# Patient Record
Sex: FEMALE | Race: BLACK OR AFRICAN AMERICAN | NOT HISPANIC OR LATINO | Employment: OTHER | URBAN - METROPOLITAN AREA
[De-identification: names, ages, dates, MRNs, and addresses within clinical notes are randomized per-mention and may not be internally consistent; named-entity substitution may affect disease eponyms.]

---

## 2018-04-23 ENCOUNTER — HOSPITAL ENCOUNTER (INPATIENT)
Facility: OTHER | Age: 63
LOS: 3 days | Discharge: HOME | DRG: 871 | End: 2018-04-27
Attending: EMERGENCY MEDICINE | Admitting: HOSPITALIST
Payer: MEDICAID

## 2018-04-23 DIAGNOSIS — J41.0 SIMPLE CHRONIC BRONCHITIS: ICD-10-CM

## 2018-04-23 DIAGNOSIS — R09.02 HYPOXIA: ICD-10-CM

## 2018-04-23 DIAGNOSIS — J18.9 SEPSIS DUE TO PNEUMONIA: ICD-10-CM

## 2018-04-23 DIAGNOSIS — A41.9 SEPSIS DUE TO PNEUMONIA: ICD-10-CM

## 2018-04-23 DIAGNOSIS — R09.02 HYPOXEMIA: ICD-10-CM

## 2018-04-23 DIAGNOSIS — R06.02 SOB (SHORTNESS OF BREATH): ICD-10-CM

## 2018-04-23 DIAGNOSIS — J18.9 PNEUMONIA OF BOTH LUNGS DUE TO INFECTIOUS ORGANISM, UNSPECIFIED PART OF LUNG: Primary | ICD-10-CM

## 2018-04-23 DIAGNOSIS — D56.3 THALASSEMIA MINOR: ICD-10-CM

## 2018-04-23 DIAGNOSIS — J44.1 COPD WITH ACUTE EXACERBATION: ICD-10-CM

## 2018-04-23 DIAGNOSIS — E11.9 TYPE 2 DIABETES MELLITUS WITHOUT COMPLICATION, WITHOUT LONG-TERM CURRENT USE OF INSULIN: ICD-10-CM

## 2018-04-23 DIAGNOSIS — R07.9 ACUTE CHEST PAIN: ICD-10-CM

## 2018-04-23 LAB
ALBUMIN SERPL BCP-MCNC: 3.4 G/DL
ALP SERPL-CCNC: 67 U/L
ALT SERPL W/O P-5'-P-CCNC: 14 U/L
ANION GAP SERPL CALC-SCNC: 11 MMOL/L
ANISOCYTOSIS BLD QL SMEAR: SLIGHT
AST SERPL-CCNC: 18 U/L
BASO STIPL BLD QL SMEAR: ABNORMAL
BASOPHILS # BLD AUTO: 0.01 K/UL
BASOPHILS NFR BLD: 0.1 %
BILIRUB SERPL-MCNC: 0.6 MG/DL
BNP SERPL-MCNC: 46 PG/ML
BUN SERPL-MCNC: 12 MG/DL
CALCIUM SERPL-MCNC: 9.5 MG/DL
CHLORIDE SERPL-SCNC: 100 MMOL/L
CO2 SERPL-SCNC: 27 MMOL/L
CREAT SERPL-MCNC: 0.8 MG/DL
DIFFERENTIAL METHOD: ABNORMAL
EOSINOPHIL # BLD AUTO: 0.1 K/UL
EOSINOPHIL NFR BLD: 1.1 %
ERYTHROCYTE [DISTWIDTH] IN BLOOD BY AUTOMATED COUNT: 18.7 %
EST. GFR  (AFRICAN AMERICAN): >60 ML/MIN/1.73 M^2
EST. GFR  (NON AFRICAN AMERICAN): >60 ML/MIN/1.73 M^2
GLUCOSE SERPL-MCNC: 155 MG/DL
HCT VFR BLD AUTO: 29.1 %
HGB BLD-MCNC: 8.7 G/DL
HYPOCHROMIA BLD QL SMEAR: ABNORMAL
INR PPP: 0.9
LYMPHOCYTES # BLD AUTO: 1.1 K/UL
LYMPHOCYTES NFR BLD: 10.8 %
MCH RBC QN AUTO: 21.4 PG
MCHC RBC AUTO-ENTMCNC: 29.9 G/DL
MCV RBC AUTO: 72 FL
MONOCYTES # BLD AUTO: 0.2 K/UL
MONOCYTES NFR BLD: 2 %
NEUTROPHILS # BLD AUTO: 8.3 K/UL
NEUTROPHILS NFR BLD: 86 %
PLATELET # BLD AUTO: 196 K/UL
PLATELET BLD QL SMEAR: ABNORMAL
PMV BLD AUTO: ABNORMAL FL
POCT GLUCOSE: 164 MG/DL (ref 70–110)
POLYCHROMASIA BLD QL SMEAR: ABNORMAL
POTASSIUM SERPL-SCNC: 3.7 MMOL/L
PROT SERPL-MCNC: 6.7 G/DL
PROTHROMBIN TIME: 10.3 SEC
RBC # BLD AUTO: 4.06 M/UL
SODIUM SERPL-SCNC: 138 MMOL/L
STOMATOCYTES BLD QL SMEAR: PRESENT
TROPONIN I SERPL DL<=0.01 NG/ML-MCNC: 0.01 NG/ML
WBC # BLD AUTO: 9.68 K/UL

## 2018-04-23 PROCEDURE — 99285 EMERGENCY DEPT VISIT HI MDM: CPT | Mod: 25

## 2018-04-23 PROCEDURE — 83605 ASSAY OF LACTIC ACID: CPT

## 2018-04-23 PROCEDURE — 85610 PROTHROMBIN TIME: CPT

## 2018-04-23 PROCEDURE — 25000003 PHARM REV CODE 250: Performed by: EMERGENCY MEDICINE

## 2018-04-23 PROCEDURE — 87040 BLOOD CULTURE FOR BACTERIA: CPT | Mod: 59

## 2018-04-23 PROCEDURE — 85025 COMPLETE CBC W/AUTO DIFF WBC: CPT

## 2018-04-23 PROCEDURE — 93005 ELECTROCARDIOGRAM TRACING: CPT

## 2018-04-23 PROCEDURE — 84484 ASSAY OF TROPONIN QUANT: CPT

## 2018-04-23 PROCEDURE — 94644 CONT INHLJ TX 1ST HOUR: CPT

## 2018-04-23 PROCEDURE — 96375 TX/PRO/DX INJ NEW DRUG ADDON: CPT

## 2018-04-23 PROCEDURE — 82962 GLUCOSE BLOOD TEST: CPT

## 2018-04-23 PROCEDURE — 96374 THER/PROPH/DIAG INJ IV PUSH: CPT

## 2018-04-23 PROCEDURE — 63600175 PHARM REV CODE 636 W HCPCS: Performed by: EMERGENCY MEDICINE

## 2018-04-23 PROCEDURE — 83880 ASSAY OF NATRIURETIC PEPTIDE: CPT

## 2018-04-23 PROCEDURE — 80053 COMPREHEN METABOLIC PANEL: CPT

## 2018-04-23 PROCEDURE — 25000242 PHARM REV CODE 250 ALT 637 W/ HCPCS: Performed by: EMERGENCY MEDICINE

## 2018-04-23 RX ORDER — TIOTROPIUM BROMIDE 18 UG/1
18 CAPSULE ORAL; RESPIRATORY (INHALATION) DAILY
COMMUNITY

## 2018-04-23 RX ORDER — NAPROXEN SODIUM 220 MG/1
81 TABLET, FILM COATED ORAL DAILY
COMMUNITY

## 2018-04-23 RX ORDER — CEFTRIAXONE 1 G/1
1 INJECTION, POWDER, FOR SOLUTION INTRAMUSCULAR; INTRAVENOUS
Status: COMPLETED | OUTPATIENT
Start: 2018-04-24 | End: 2018-04-24

## 2018-04-23 RX ORDER — PREGABALIN 150 MG/1
150 CAPSULE ORAL 2 TIMES DAILY
COMMUNITY

## 2018-04-23 RX ORDER — ONDANSETRON 4 MG/1
8 TABLET, FILM COATED ORAL 2 TIMES DAILY
COMMUNITY

## 2018-04-23 RX ORDER — ATORVASTATIN CALCIUM 20 MG/1
20 TABLET, FILM COATED ORAL NIGHTLY
COMMUNITY

## 2018-04-23 RX ORDER — AMLODIPINE BESYLATE 5 MG/1
5 TABLET ORAL DAILY
COMMUNITY

## 2018-04-23 RX ORDER — INSULIN GLARGINE 100 [IU]/ML
25 INJECTION, SOLUTION SUBCUTANEOUS NIGHTLY
COMMUNITY

## 2018-04-23 RX ORDER — METOPROLOL SUCCINATE 25 MG/1
25 TABLET, EXTENDED RELEASE ORAL DAILY
COMMUNITY

## 2018-04-23 RX ORDER — ONDANSETRON 2 MG/ML
4 INJECTION INTRAMUSCULAR; INTRAVENOUS
Status: COMPLETED | OUTPATIENT
Start: 2018-04-23 | End: 2018-04-23

## 2018-04-23 RX ORDER — ASPIRIN 325 MG
325 TABLET, DELAYED RELEASE (ENTERIC COATED) ORAL
Status: COMPLETED | OUTPATIENT
Start: 2018-04-23 | End: 2018-04-23

## 2018-04-23 RX ORDER — FLUTICASONE PROPIONATE AND SALMETEROL 250; 50 UG/1; UG/1
1 POWDER RESPIRATORY (INHALATION) 2 TIMES DAILY
COMMUNITY

## 2018-04-23 RX ORDER — ACETAMINOPHEN 500 MG
1000 TABLET ORAL
Status: COMPLETED | OUTPATIENT
Start: 2018-04-23 | End: 2018-04-23

## 2018-04-23 RX ORDER — METFORMIN HYDROCHLORIDE 1000 MG/1
1000 TABLET ORAL 2 TIMES DAILY WITH MEALS
COMMUNITY

## 2018-04-23 RX ORDER — IPRATROPIUM BROMIDE 0.5 MG/2.5ML
500 SOLUTION RESPIRATORY (INHALATION) 4 TIMES DAILY
Status: ON HOLD | COMMUNITY
End: 2018-04-27

## 2018-04-23 RX ORDER — CHLORTHALIDONE 25 MG/1
25 TABLET ORAL DAILY
COMMUNITY

## 2018-04-23 RX ORDER — ALBUTEROL SULFATE 0.63 MG/3ML
0.63 SOLUTION RESPIRATORY (INHALATION) EVERY 6 HOURS PRN
COMMUNITY

## 2018-04-23 RX ORDER — DOCUSATE SODIUM 100 MG/1
100 CAPSULE, LIQUID FILLED ORAL 2 TIMES DAILY
COMMUNITY

## 2018-04-23 RX ORDER — ALBUTEROL SULFATE 0.83 MG/ML
2.5 SOLUTION RESPIRATORY (INHALATION)
Status: COMPLETED | OUTPATIENT
Start: 2018-04-23 | End: 2018-04-23

## 2018-04-23 RX ADMIN — ALBUTEROL SULFATE 2.5 MG: 2.5 SOLUTION RESPIRATORY (INHALATION) at 10:04

## 2018-04-23 RX ADMIN — ACETAMINOPHEN 1000 MG: 500 TABLET ORAL at 11:04

## 2018-04-23 RX ADMIN — ONDANSETRON 4 MG: 2 INJECTION INTRAMUSCULAR; INTRAVENOUS at 10:04

## 2018-04-23 RX ADMIN — ASPIRIN 325 MG: 325 TABLET, DELAYED RELEASE ORAL at 10:04

## 2018-04-24 PROBLEM — J44.1 COPD WITH ACUTE EXACERBATION: Status: ACTIVE | Noted: 2018-04-24

## 2018-04-24 PROBLEM — J18.9 SEPSIS DUE TO PNEUMONIA: Status: ACTIVE | Noted: 2018-04-24

## 2018-04-24 PROBLEM — R09.02 HYPOXEMIA: Status: ACTIVE | Noted: 2018-04-24

## 2018-04-24 PROBLEM — E11.9 TYPE 2 DIABETES MELLITUS: Status: ACTIVE | Noted: 2018-04-24

## 2018-04-24 PROBLEM — D56.3 THALASSEMIA MINOR: Status: ACTIVE | Noted: 2018-04-24

## 2018-04-24 PROBLEM — J45.909 ASTHMA: Status: ACTIVE | Noted: 2018-04-24

## 2018-04-24 PROBLEM — J41.0 SIMPLE CHRONIC BRONCHITIS: Status: ACTIVE | Noted: 2018-04-24

## 2018-04-24 PROBLEM — J44.9 COPD (CHRONIC OBSTRUCTIVE PULMONARY DISEASE): Status: ACTIVE | Noted: 2018-04-24

## 2018-04-24 PROBLEM — J18.9 PNEUMONIA OF BOTH LUNGS DUE TO INFECTIOUS ORGANISM: Status: ACTIVE | Noted: 2018-04-24

## 2018-04-24 PROBLEM — A41.9 SEPSIS DUE TO PNEUMONIA: Status: ACTIVE | Noted: 2018-04-24

## 2018-04-24 LAB
BILIRUB UR QL STRIP: NEGATIVE
CLARITY UR: CLEAR
COLOR UR: YELLOW
ESTIMATED AVG GLUCOSE: 169 MG/DL
GLUCOSE UR QL STRIP: NEGATIVE
HBA1C MFR BLD HPLC: 7.5 %
HGB UR QL STRIP: NEGATIVE
KETONES UR QL STRIP: NEGATIVE
LACTATE SERPL-SCNC: 3.2 MMOL/L
LEUKOCYTE ESTERASE UR QL STRIP: NEGATIVE
NITRITE UR QL STRIP: NEGATIVE
PH UR STRIP: 6 [PH] (ref 5–8)
POCT GLUCOSE: 134 MG/DL (ref 70–110)
POCT GLUCOSE: 161 MG/DL (ref 70–110)
POCT GLUCOSE: 164 MG/DL (ref 70–110)
POCT GLUCOSE: 85 MG/DL (ref 70–110)
PROT UR QL STRIP: NEGATIVE
SP GR UR STRIP: <=1.005 (ref 1–1.03)
URN SPEC COLLECT METH UR: ABNORMAL
UROBILINOGEN UR STRIP-ACNC: NEGATIVE EU/DL

## 2018-04-24 PROCEDURE — 63600175 PHARM REV CODE 636 W HCPCS: Performed by: PHYSICIAN ASSISTANT

## 2018-04-24 PROCEDURE — 27000221 HC OXYGEN, UP TO 24 HOURS

## 2018-04-24 PROCEDURE — 94761 N-INVAS EAR/PLS OXIMETRY MLT: CPT

## 2018-04-24 PROCEDURE — 25000003 PHARM REV CODE 250: Performed by: PHYSICIAN ASSISTANT

## 2018-04-24 PROCEDURE — 94640 AIRWAY INHALATION TREATMENT: CPT

## 2018-04-24 PROCEDURE — 25000242 PHARM REV CODE 250 ALT 637 W/ HCPCS: Performed by: PHYSICIAN ASSISTANT

## 2018-04-24 PROCEDURE — 63600175 PHARM REV CODE 636 W HCPCS: Performed by: EMERGENCY MEDICINE

## 2018-04-24 PROCEDURE — 25000242 PHARM REV CODE 250 ALT 637 W/ HCPCS: Performed by: INTERNAL MEDICINE

## 2018-04-24 PROCEDURE — 81003 URINALYSIS AUTO W/O SCOPE: CPT

## 2018-04-24 PROCEDURE — 83036 HEMOGLOBIN GLYCOSYLATED A1C: CPT

## 2018-04-24 PROCEDURE — 11000001 HC ACUTE MED/SURG PRIVATE ROOM

## 2018-04-24 PROCEDURE — 36415 COLL VENOUS BLD VENIPUNCTURE: CPT

## 2018-04-24 PROCEDURE — 99223 1ST HOSP IP/OBS HIGH 75: CPT | Mod: ,,, | Performed by: HOSPITALIST

## 2018-04-24 RX ORDER — ENOXAPARIN SODIUM 100 MG/ML
40 INJECTION SUBCUTANEOUS EVERY 24 HOURS
Status: DISCONTINUED | OUTPATIENT
Start: 2018-04-24 | End: 2018-04-27 | Stop reason: HOSPADM

## 2018-04-24 RX ORDER — NAPROXEN SODIUM 220 MG/1
81 TABLET, FILM COATED ORAL DAILY
Status: DISCONTINUED | OUTPATIENT
Start: 2018-04-24 | End: 2018-04-27 | Stop reason: HOSPADM

## 2018-04-24 RX ORDER — ONDANSETRON 8 MG/1
8 TABLET, ORALLY DISINTEGRATING ORAL EVERY 8 HOURS PRN
Status: DISCONTINUED | OUTPATIENT
Start: 2018-04-24 | End: 2018-04-27 | Stop reason: HOSPADM

## 2018-04-24 RX ORDER — IBUPROFEN 200 MG
16 TABLET ORAL
Status: DISCONTINUED | OUTPATIENT
Start: 2018-04-24 | End: 2018-04-27 | Stop reason: HOSPADM

## 2018-04-24 RX ORDER — PREGABALIN 75 MG/1
150 CAPSULE ORAL 2 TIMES DAILY
Status: DISCONTINUED | OUTPATIENT
Start: 2018-04-24 | End: 2018-04-27 | Stop reason: HOSPADM

## 2018-04-24 RX ORDER — FLUTICASONE PROPIONATE AND SALMETEROL 250; 50 UG/1; UG/1
1 POWDER RESPIRATORY (INHALATION) 2 TIMES DAILY
Status: DISCONTINUED | OUTPATIENT
Start: 2018-04-24 | End: 2018-04-24

## 2018-04-24 RX ORDER — BENZONATATE 100 MG/1
100 CAPSULE ORAL 3 TIMES DAILY PRN
Status: DISCONTINUED | OUTPATIENT
Start: 2018-04-24 | End: 2018-04-27 | Stop reason: HOSPADM

## 2018-04-24 RX ORDER — FLUTICASONE FUROATE AND VILANTEROL 100; 25 UG/1; UG/1
1 POWDER RESPIRATORY (INHALATION) DAILY
Status: DISCONTINUED | OUTPATIENT
Start: 2018-04-24 | End: 2018-04-27 | Stop reason: HOSPADM

## 2018-04-24 RX ORDER — TIOTROPIUM BROMIDE 18 UG/1
18 CAPSULE ORAL; RESPIRATORY (INHALATION) DAILY
Status: DISCONTINUED | OUTPATIENT
Start: 2018-04-24 | End: 2018-04-24 | Stop reason: CLARIF

## 2018-04-24 RX ORDER — SODIUM CHLORIDE 0.9 % (FLUSH) 0.9 %
5 SYRINGE (ML) INJECTION
Status: DISCONTINUED | OUTPATIENT
Start: 2018-04-24 | End: 2018-04-27 | Stop reason: HOSPADM

## 2018-04-24 RX ORDER — INSULIN ASPART 100 [IU]/ML
0-5 INJECTION, SOLUTION INTRAVENOUS; SUBCUTANEOUS
Status: DISCONTINUED | OUTPATIENT
Start: 2018-04-24 | End: 2018-04-27 | Stop reason: HOSPADM

## 2018-04-24 RX ORDER — INSULIN ASPART 100 [IU]/ML
21 INJECTION, SOLUTION INTRAVENOUS; SUBCUTANEOUS
Status: DISCONTINUED | OUTPATIENT
Start: 2018-04-24 | End: 2018-04-27 | Stop reason: HOSPADM

## 2018-04-24 RX ORDER — ACETAMINOPHEN 325 MG/1
650 TABLET ORAL EVERY 4 HOURS PRN
Status: DISCONTINUED | OUTPATIENT
Start: 2018-04-24 | End: 2018-04-27 | Stop reason: HOSPADM

## 2018-04-24 RX ORDER — GLUCAGON 1 MG
1 KIT INJECTION
Status: DISCONTINUED | OUTPATIENT
Start: 2018-04-24 | End: 2018-04-27 | Stop reason: HOSPADM

## 2018-04-24 RX ORDER — ATORVASTATIN CALCIUM 20 MG/1
20 TABLET, FILM COATED ORAL NIGHTLY
Status: DISCONTINUED | OUTPATIENT
Start: 2018-04-24 | End: 2018-04-27 | Stop reason: HOSPADM

## 2018-04-24 RX ORDER — METOPROLOL SUCCINATE 25 MG/1
25 TABLET, EXTENDED RELEASE ORAL DAILY
Status: DISCONTINUED | OUTPATIENT
Start: 2018-04-24 | End: 2018-04-27 | Stop reason: HOSPADM

## 2018-04-24 RX ORDER — AMLODIPINE BESYLATE 5 MG/1
5 TABLET ORAL DAILY
Status: DISCONTINUED | OUTPATIENT
Start: 2018-04-24 | End: 2018-04-27 | Stop reason: HOSPADM

## 2018-04-24 RX ORDER — SODIUM CHLORIDE 9 MG/ML
INJECTION, SOLUTION INTRAVENOUS CONTINUOUS
Status: DISCONTINUED | OUTPATIENT
Start: 2018-04-24 | End: 2018-04-25

## 2018-04-24 RX ORDER — IPRATROPIUM BROMIDE 0.5 MG/2.5ML
500 SOLUTION RESPIRATORY (INHALATION) 4 TIMES DAILY
Status: DISCONTINUED | OUTPATIENT
Start: 2018-04-24 | End: 2018-04-25

## 2018-04-24 RX ORDER — IBUPROFEN 200 MG
24 TABLET ORAL
Status: DISCONTINUED | OUTPATIENT
Start: 2018-04-24 | End: 2018-04-27 | Stop reason: HOSPADM

## 2018-04-24 RX ORDER — TIOTROPIUM BROMIDE 18 UG/1
1 CAPSULE ORAL; RESPIRATORY (INHALATION) DAILY
Status: DISCONTINUED | OUTPATIENT
Start: 2018-04-24 | End: 2018-04-27 | Stop reason: HOSPADM

## 2018-04-24 RX ADMIN — IPRATROPIUM BROMIDE 500 MCG: 0.5 SOLUTION RESPIRATORY (INHALATION) at 01:04

## 2018-04-24 RX ADMIN — SODIUM CHLORIDE: 0.9 INJECTION, SOLUTION INTRAVENOUS at 05:04

## 2018-04-24 RX ADMIN — ENOXAPARIN SODIUM 40 MG: 100 INJECTION SUBCUTANEOUS at 04:04

## 2018-04-24 RX ADMIN — ASPIRIN 81 MG CHEWABLE TABLET 81 MG: 81 TABLET CHEWABLE at 08:04

## 2018-04-24 RX ADMIN — ACETAMINOPHEN 650 MG: 325 TABLET ORAL at 04:04

## 2018-04-24 RX ADMIN — INSULIN ASPART 21 UNITS: 100 INJECTION, SOLUTION INTRAVENOUS; SUBCUTANEOUS at 12:04

## 2018-04-24 RX ADMIN — INSULIN ASPART 21 UNITS: 100 INJECTION, SOLUTION INTRAVENOUS; SUBCUTANEOUS at 04:04

## 2018-04-24 RX ADMIN — IPRATROPIUM BROMIDE 500 MCG: 0.5 SOLUTION RESPIRATORY (INHALATION) at 08:04

## 2018-04-24 RX ADMIN — AMLODIPINE BESYLATE 5 MG: 5 TABLET ORAL at 08:04

## 2018-04-24 RX ADMIN — PREGABALIN 150 MG: 75 CAPSULE ORAL at 08:04

## 2018-04-24 RX ADMIN — ACETAMINOPHEN 650 MG: 325 TABLET ORAL at 09:04

## 2018-04-24 RX ADMIN — BENZONATATE 100 MG: 100 CAPSULE ORAL at 09:04

## 2018-04-24 RX ADMIN — SODIUM CHLORIDE: 0.9 INJECTION, SOLUTION INTRAVENOUS at 09:04

## 2018-04-24 RX ADMIN — INSULIN DETEMIR 15 UNITS: 100 INJECTION, SOLUTION SUBCUTANEOUS at 08:04

## 2018-04-24 RX ADMIN — FLUTICASONE FUROATE AND VILANTEROL TRIFENATATE 1 PUFF: 100; 25 POWDER RESPIRATORY (INHALATION) at 08:04

## 2018-04-24 RX ADMIN — CEFTRIAXONE SODIUM 1 G: 1 INJECTION, POWDER, FOR SOLUTION INTRAMUSCULAR; INTRAVENOUS at 12:04

## 2018-04-24 RX ADMIN — AZITHROMYCIN MONOHYDRATE 500 MG: 500 INJECTION, POWDER, LYOPHILIZED, FOR SOLUTION INTRAVENOUS at 01:04

## 2018-04-24 RX ADMIN — METOPROLOL SUCCINATE 25 MG: 25 TABLET, EXTENDED RELEASE ORAL at 08:04

## 2018-04-24 RX ADMIN — TIOTROPIUM BROMIDE 18 MCG: 18 CAPSULE ORAL; RESPIRATORY (INHALATION) at 08:04

## 2018-04-24 RX ADMIN — INSULIN ASPART 21 UNITS: 100 INJECTION, SOLUTION INTRAVENOUS; SUBCUTANEOUS at 08:04

## 2018-04-24 RX ADMIN — ACETAMINOPHEN 650 MG: 325 TABLET ORAL at 10:04

## 2018-04-24 RX ADMIN — ATORVASTATIN CALCIUM 20 MG: 20 TABLET, FILM COATED ORAL at 08:04

## 2018-04-24 NOTE — SUBJECTIVE & OBJECTIVE
Past Medical History:   Diagnosis Date    Asthma     Bronchitis     COPD (chronic obstructive pulmonary disease)     Diabetes mellitus     Hypercholesteremia     Hypertension     Sleep apnea        Past Surgical History:   Procedure Laterality Date    APPENDECTOMY       SECTION      COLON SURGERY      HERNIA REPAIR      HYSTERECTOMY         Review of patient's allergies indicates:   Allergen Reactions    Lortab [hydrocodone-acetaminophen] Shortness Of Breath and Itching    Percocet [oxycodone-acetaminophen] Shortness Of Breath and Itching    Ace inhibitors Other (See Comments)     Cough       No current facility-administered medications on file prior to encounter.      No current outpatient prescriptions on file prior to encounter.     Family History     None        Social History Main Topics    Smoking status: Former Smoker    Smokeless tobacco: Never Used    Alcohol use No    Drug use: No    Sexual activity: Not on file     Review of Systems   Constitutional: Positive for appetite change (decreased), chills and fever. Negative for activity change, diaphoresis and fatigue.   HENT: Negative for congestion, ear pain, postnasal drip, rhinorrhea, sinus pressure, sore throat and trouble swallowing.    Respiratory: Positive for cough, chest tightness and shortness of breath. Negative for wheezing.    Cardiovascular: Positive for chest pain. Negative for palpitations and leg swelling.   Gastrointestinal: Negative for abdominal pain, constipation, diarrhea, nausea and vomiting.   Genitourinary: Negative for dysuria, flank pain, frequency, hematuria and urgency.   Musculoskeletal: Positive for myalgias. Negative for arthralgias.   Skin: Negative for color change and pallor.   Neurological: Negative for dizziness, syncope, weakness, light-headedness, numbness and headaches.   Psychiatric/Behavioral: Negative for confusion and decreased concentration.     Objective:     Vital Signs (Most  Recent):  Temp: 99 °F (37.2 °C) (04/24/18 0340)  Pulse: 78 (04/24/18 0600)  Resp: 18 (04/24/18 0106)  BP: (!) 115/56 (04/24/18 0340)  SpO2: 97 % (04/24/18 0340) Vital Signs (24h Range):  Temp:  [99 °F (37.2 °C)-100.8 °F (38.2 °C)] 99 °F (37.2 °C)  Pulse:  [] 78  Resp:  [13-24] 18  SpO2:  [87 %-97 %] 97 %  BP: (107-151)/(52-65) 115/56     Weight: 118.2 kg (260 lb 9.3 oz)  Body mass index is 50.89 kg/m².    Physical Exam   Constitutional: She is oriented to person, place, and time. She appears well-developed and well-nourished. No distress.   HENT:   Head: Normocephalic and atraumatic.   Eyes: Conjunctivae and EOM are normal. Pupils are equal, round, and reactive to light. No scleral icterus.   Neck: Normal range of motion. Neck supple. No JVD present. No tracheal deviation present.   Cardiovascular: Normal rate, regular rhythm, normal heart sounds and intact distal pulses.  Exam reveals no gallop and no friction rub.    No murmur heard.  Pulmonary/Chest: Effort normal. No stridor. No respiratory distress. She has wheezes (mild expiratory). She has no rales. She exhibits tenderness.   Decreased breath sounds throughout   Abdominal: Soft. Bowel sounds are normal. She exhibits no distension. There is no tenderness. There is no guarding.   Neurological: She is alert and oriented to person, place, and time.   Skin: Skin is warm and dry. She is not diaphoretic.   Psychiatric: She has a normal mood and affect. Her behavior is normal. Judgment and thought content normal.   Nursing note and vitals reviewed.        CRANIAL NERVES     CN III, IV, VI   Pupils are equal, round, and reactive to light.  Extraocular motions are normal.        Significant Labs:   BMP:   Recent Labs  Lab 04/23/18  2250   *      K 3.7      CO2 27   BUN 12   CREATININE 0.8   CALCIUM 9.5     CBC:   Recent Labs  Lab 04/23/18  2250   WBC 9.68   HGB 8.7*   HCT 29.1*        CMP:   Recent Labs  Lab 04/23/18  2250      K  3.7      CO2 27   *   BUN 12   CREATININE 0.8   CALCIUM 9.5   PROT 6.7   ALBUMIN 3.4*   BILITOT 0.6   ALKPHOS 67   AST 18   ALT 14   ANIONGAP 11   EGFRNONAA >60     All pertinent labs within the past 24 hours have been reviewed.    Significant Imaging: I have reviewed all pertinent imaging results/findings within the past 24 hours.   Imaging Results          X-Ray Chest PA And Lateral (Final result)  Result time 04/23/18 23:44:39    Final result by Dylon Vivas MD (04/23/18 23:44:39)                 Impression:      Multifocal airspace opacities.      Electronically signed by: Dylon Vivas MD  Date:    04/23/2018  Time:    23:44             Narrative:    EXAMINATION:  XR CHEST PA AND LATERAL    CLINICAL HISTORY:  Cough;    TECHNIQUE:  PA and lateral views of the chest were performed.    COMPARISON:  None    FINDINGS:  Monitoring EKG leads are present.  The trachea is unremarkable.  The cardiac silhouette is at upper limits of normal.  There is mild elevation of the right hemidiaphragm.  The right costophrenic angle is not completely imaged on the frontal projection.  The left hemidiaphragm is unremarkable.  There is no evidence of free air beneath the hemidiaphragms.  There are no pleural effusions.  There is no evidence of a pneumothorax.  There is no evidence of pneumomediastinum.  There are multifocal airspace opacities.  The osseous structures are unremarkable.

## 2018-04-24 NOTE — ED NOTES
Pt c/o SOB x 4 days and chest pain since 1000 hrs this AM. Pt states she is here from out of town and was not able to bring her oxygen tank on the plane w/ her. Pt states she is on 2 LMP via [place] PRN and has been in town 5 days w/out it. Pt states the chest pain is pressing in nature and not affected by breathing and pain radiates through to the back. Pt also c/o nausea. Pt is A & O x 3, denies fever, chills and cough, dry or productive. Skin is warm and dry w/ pink mucosa. VSS. Pt is obese. FILEMON x 3mm. BBS- Wheezing upon expiration to the mid and upper lobes bilaterally. Abd- SNT. PSM x 4 exts. Pt is connected to the pulse ox, B/P cuff and EKG monitor. Bed is locked and in the low position w/ the side rails up and locked for pt safety. Call bell @ BS. Will continue to monitor closely.

## 2018-04-24 NOTE — HOSPITAL COURSE
Patient was admitted on IV antibiotics and oxygen to treat pneumonia.  The fever rapidly improved, although she continued to have a dry cough.  She was not wheezing on admission and did not require steroids while here.  Chest PT was added 4/25 due to persistent hypoxemia and inability to cough up secretions.  Case management arranged for oxygen to be supplied for her trip home.  She tended to decrease her oxygen saturation significantly with ambulation even with oxygen on.  At rest she typically requires 2 liters, but with ambulation has to turn it up.  At discharge she was feeling better and was prescribed Augmentin to complete a 10 day course of antibiotics.

## 2018-04-24 NOTE — PLAN OF CARE
Problem: Patient Care Overview  Goal: Plan of Care Review  Outcome: Ongoing (interventions implemented as appropriate)  Patient lying quietly in bed with eyes closed. No distress or discomfort noted at this time. Sinus rhythm on telemetry. Right antecubital 20 gauge IV access intact with normal saline infusing at 125 mL/hour. 300 mL of clear ignacio urine noted this morning from patient. Urine specimen sent to lab for urinalysis. Call light within reach. Encouraged patient to call for any and all needs.Patient verbalized understanding of instructions. Will continue to monitor patient.

## 2018-04-24 NOTE — ED PROVIDER NOTES
"Encounter Date: 4/23/2018    SCRIBE #1 NOTE: I, Kiesha Doss, am scribing for, and in the presence of, Dr. Pete.       History     Chief Complaint   Patient presents with    Shortness of Breath     Pt CO inc SOB Xs 5 days, worsening tonight, has not had home O2 for 5 days Hx of COPD.      Time seen by provider: 10:04 PM    This is a 62 y.o. Female, with history of COPD, who presents with complaint of chest pain that has been intermittent for approximately twelve hours. She reports that the pain feels "raw and heavy" and radiates to her back. The patient reports additional SOB that has been intermittent for the past three days, a non-productive cough, and chills. She denies fever, rhinorrhea, nausea, vomiting, leg swelling, headache, dizziness, or diaphoresis. The patient reports that she is on 2L of oxygen at home, but she is in town from [place] and it was "too late for her to bring it with her." She reports "that her oxygen was low." The patient reports that she has been using the albuterol nebulizer every four hours with no relief. The patient cannot recall if she has had a stress test or angiogram done in the past. The patient reports that her blood glucose level ranges from 120-170 mg/dL on average. She notes that she took her HTN medication today. The patient denies the use of tobacco for 30+ years. The patient denies PMHx of MI or CHF. She reports that her mother had PMHx of CHF.      The history is provided by the patient.     Review of patient's allergies indicates:   Allergen Reactions    Lortab [hydrocodone-acetaminophen] Shortness Of Breath and Itching    Percocet [oxycodone-acetaminophen] Shortness Of Breath and Itching    Ace inhibitors Other (See Comments)     Cough     Past Medical History:   Diagnosis Date    Asthma     Bronchitis     COPD (chronic obstructive pulmonary disease)     Diabetes mellitus     Hypercholesteremia     Hypertension     Sleep apnea      Past Surgical History: "   Procedure Laterality Date    APPENDECTOMY       SECTION      COLON SURGERY      HERNIA REPAIR      HYSTERECTOMY       History reviewed. No pertinent family history.  Social History   Substance Use Topics    Smoking status: Former Smoker    Smokeless tobacco: Never Used    Alcohol use No     Review of Systems   Constitutional: Positive for chills. Negative for fever.   HENT: Negative for rhinorrhea and sore throat.    Respiratory: Positive for cough and shortness of breath.    Cardiovascular: Positive for chest pain.   Gastrointestinal: Negative for nausea and vomiting.   Genitourinary: Negative for dysuria.   Musculoskeletal: Negative for back pain.   Skin: Negative for rash.   Neurological: Negative for weakness.   Hematological: Does not bruise/bleed easily.       Physical Exam     Initial Vitals [18 2155]   BP Pulse Resp Temp SpO2   139/64 102 (!) 24 (!) 100.8 °F (38.2 °C) (!) 87 %      MAP       89         Physical Exam    Nursing note and vitals reviewed.  Constitutional: She appears well-developed and well-nourished. She is cooperative.  Non-toxic appearance. No distress.   HENT:   Head: Normocephalic and atraumatic.   Eyes: Conjunctivae and EOM are normal.   Neck: Normal range of motion and full passive range of motion without pain. Neck supple.   Cardiovascular: Normal rate, regular rhythm, normal heart sounds and normal pulses.   Pulmonary/Chest: Effort normal and breath sounds normal. No respiratory distress.   Moves air well. Mild end-expiratory wheezes.   Abdominal: Soft. Normal appearance and bowel sounds are normal. There is no tenderness.   Musculoskeletal: Normal range of motion.   Neurological: She is alert and oriented to person, place, and time. She has normal strength. No cranial nerve deficit or sensory deficit.   Skin: Skin is warm, dry and intact.   Psychiatric: She has a normal mood and affect. Her speech is normal and behavior is normal. Judgment and thought content  normal.         ED Course   Procedures  Labs Reviewed   CBC W/ AUTO DIFFERENTIAL - Abnormal; Notable for the following:        Result Value    Hemoglobin 8.7 (*)     Hematocrit 29.1 (*)     MCV 72 (*)     MCH 21.4 (*)     MCHC 29.9 (*)     RDW 18.7 (*)     Gran # (ANC) 8.3 (*)     Mono # 0.2 (*)     Gran% 86.0 (*)     Lymph% 10.8 (*)     Mono% 2.0 (*)     All other components within normal limits   COMPREHENSIVE METABOLIC PANEL - Abnormal; Notable for the following:     Glucose 155 (*)     Albumin 3.4 (*)     All other components within normal limits   POCT GLUCOSE - Abnormal; Notable for the following:     POCT Glucose 164 (*)     All other components within normal limits   CULTURE, BLOOD   CULTURE, BLOOD   PROTIME-INR   TROPONIN I   B-TYPE NATRIURETIC PEPTIDE   URINALYSIS   LACTIC ACID, PLASMA   POCT GLUCOSE MONITORING CONTINUOUS     Imaging Results          X-Ray Chest PA And Lateral (Final result)  Result time 04/23/18 23:44:39    Final result by Dylon Vivas MD (04/23/18 23:44:39)                 Impression:      Multifocal airspace opacities.      Electronically signed by: Dylon Vivas MD  Date:    04/23/2018  Time:    23:44             Narrative:    EXAMINATION:  XR CHEST PA AND LATERAL    CLINICAL HISTORY:  Cough;    TECHNIQUE:  PA and lateral views of the chest were performed.    COMPARISON:  None    FINDINGS:  Monitoring EKG leads are present.  The trachea is unremarkable.  The cardiac silhouette is at upper limits of normal.  There is mild elevation of the right hemidiaphragm.  The right costophrenic angle is not completely imaged on the frontal projection.  The left hemidiaphragm is unremarkable.  There is no evidence of free air beneath the hemidiaphragms.  There are no pleural effusions.  There is no evidence of a pneumothorax.  There is no evidence of pneumomediastinum.  There are multifocal airspace opacities.  The osseous structures are unremarkable.                                EKG Readings:  (Independently Interpreted)    Normal sinus rhythm at a rate of 93 bpm.       X-Rays:   Independently Interpreted Readings:   Chest X-Ray: Bilateral pulmonary infiltrates concerning for pneumonia.     Medical Decision Making:   Clinical Tests:   Lab Tests: Reviewed and Ordered  Radiological Study: Reviewed and Ordered  Medical Tests: Reviewed and Ordered            Scribe Attestation:   Scribe #1: I performed the above scribed service and the documentation accurately describes the services I performed. I attest to the accuracy of the note.    Attending Attestation:           Physician Attestation for Scribe:  Physician Attestation Statement for Scribe #1: I, Dr. Pete, reviewed documentation, as scribed by Kiesha Doss in my presence, and it is both accurate and complete.         Attending ED Notes:   Emergent evaluation a 62-year-old female complaining of shortness of breath associated chest tightness.  Patient has a history of COPD.  Patient is febrile, nontoxic-appearing with stable vital signs except for oxygen saturation of 88% on room air. Repeat temperature was 99.6.  Patient has no elevation of white blood cell count.  H&H is 8.7 and 29.1.BNP of 46.  No acute findings on EKG.  Chest x-ray reveals multifocal airspace opacities consistent with pneumonia.  Blood cultures are drawn.  IV antibiotics are administered.  Lactic acid is ordered after it was determined patient had pneumonia.  Lactic acid is pending on admission.The patient is extensively counseled on her diagnosis and treatment including all diagnostic, laboratory and physical exam findings.  The patient is admitted in stable condition.     12:05AM I consulted and discussed patient with Agustina mid-level on call who will admit the patient to [facility].             Clinical Impression:     1. Pneumonia of both lungs due to infectious organism, unspecified part of lung    2. Hypoxia    3. SOB (shortness of breath)    4. COPD with acute  exacerbation    5. Acute chest pain                               Lauri Flannery MD  04/24/18 0013       Lauri Flannery MD  04/24/18 0014

## 2018-04-24 NOTE — H&P
"[facility] Hospital Medicine  History & Physical    Patient Name: Marilee Ribeiro  MRN: [id number]  Admission Date: 4/23/2018  Attending Physician: Isamar Willis MD   Primary Care Provider: No primary care provider on file.         Patient information was obtained from patient, past medical records and ER records.     Subjective:     Principal Problem:<principal problem not specified>    Chief Complaint:   Chief Complaint   Patient presents with    Shortness of Breath     Pt CO inc SOB Xs 5 days, worsening tonight, has not had home O2 for 5 days Hx of COPD.         HPI: Ms. Marilee Ribeiro is a 62 y.o. Female, with history of COPD, who presents with complaint of chest pain that has been intermittent for approximately twelve hours. She reports that the pain feels "raw and heavy" and radiates to her back. The patient reports additional SOB that has been intermittent for the past three days, a non-productive cough, and chills. She denies fever, rhinorrhea, nausea, vomiting, leg swelling, headache, dizziness, or diaphoresis. The patient reports that she is on 2L of oxygen at home, but she is in town from [place] and it was "too late for her to bring it with her." She reports "that her oxygen was low." The patient reports that she has been using the albuterol nebulizer every four hours with no relief. The patient cannot recall if she has had a stress test or angiogram done in the past. The patient reports that her blood glucose level ranges from 120-170 mg/dL on average. She notes that she took her HTN medication today. The patient denies the use of tobacco for 30+ years. The patient denies PMHx of MI or CHF. She reports that her mother had PMHx of CHF.     The patient was evaluated in the ED, diagnosed with pneumonia, and admitted for IV antibiotics.     Past Medical History:   Diagnosis Date    Asthma     Bronchitis     COPD (chronic obstructive pulmonary disease)     Diabetes " mellitus     Hypercholesteremia     Hypertension     Sleep apnea        Past Surgical History:   Procedure Laterality Date    APPENDECTOMY       SECTION      COLON SURGERY      HERNIA REPAIR      HYSTERECTOMY         Review of patient's allergies indicates:   Allergen Reactions    Lortab [hydrocodone-acetaminophen] Shortness Of Breath and Itching    Percocet [oxycodone-acetaminophen] Shortness Of Breath and Itching    Ace inhibitors Other (See Comments)     Cough       No current facility-administered medications on file prior to encounter.      No current outpatient prescriptions on file prior to encounter.     Family History     None        Social History Main Topics    Smoking status: Former Smoker    Smokeless tobacco: Never Used    Alcohol use No    Drug use: No    Sexual activity: Not on file     Review of Systems   Constitutional: Positive for appetite change (decreased), chills and fever. Negative for activity change, diaphoresis and fatigue.   HENT: Negative for congestion, ear pain, postnasal drip, rhinorrhea, sinus pressure, sore throat and trouble swallowing.    Respiratory: Positive for cough, chest tightness and shortness of breath. Negative for wheezing.    Cardiovascular: Positive for chest pain. Negative for palpitations and leg swelling.   Gastrointestinal: Negative for abdominal pain, constipation, diarrhea, nausea and vomiting.   Genitourinary: Negative for dysuria, flank pain, frequency, hematuria and urgency.   Musculoskeletal: Positive for myalgias. Negative for arthralgias.   Skin: Negative for color change and pallor.   Neurological: Negative for dizziness, syncope, weakness, light-headedness, numbness and headaches.   Psychiatric/Behavioral: Negative for confusion and decreased concentration.     Objective:     Vital Signs (Most Recent):  Temp: 99 °F (37.2 °C) (18 0340)  Pulse: 78 (18 0600)  Resp: 18 (18 0106)  BP: (!) 115/56 (18 0340)  SpO2:  97 % (04/24/18 0340) Vital Signs (24h Range):  Temp:  [99 °F (37.2 °C)-100.8 °F (38.2 °C)] 99 °F (37.2 °C)  Pulse:  [] 78  Resp:  [13-24] 18  SpO2:  [87 %-97 %] 97 %  BP: (107-151)/(52-65) 115/56     Weight: 118.2 kg (260 lb 9.3 oz)  Body mass index is 50.89 kg/m².    Physical Exam   Constitutional: She is oriented to person, place, and time. She appears well-developed and well-nourished. No distress.   HENT:   Head: Normocephalic and atraumatic.   Eyes: Conjunctivae and EOM are normal. Pupils are equal, round, and reactive to light. No scleral icterus.   Neck: Normal range of motion. Neck supple. No JVD present. No tracheal deviation present.   Cardiovascular: Normal rate, regular rhythm, normal heart sounds and intact distal pulses.  Exam reveals no gallop and no friction rub.    No murmur heard.  Pulmonary/Chest: Effort normal. No stridor. No respiratory distress. She has wheezes (mild expiratory). She has no rales. She exhibits tenderness.   Decreased breath sounds throughout   Abdominal: Soft. Bowel sounds are normal. She exhibits no distension. There is no tenderness. There is no guarding.   Neurological: She is alert and oriented to person, place, and time.   Skin: Skin is warm and dry. She is not diaphoretic.   Psychiatric: She has a normal mood and affect. Her behavior is normal. Judgment and thought content normal.   Nursing note and vitals reviewed.        CRANIAL NERVES     CN III, IV, VI   Pupils are equal, round, and reactive to light.  Extraocular motions are normal.        Significant Labs:   BMP:   Recent Labs  Lab 04/23/18  2250   *      K 3.7      CO2 27   BUN 12   CREATININE 0.8   CALCIUM 9.5     CBC:   Recent Labs  Lab 04/23/18  2250   WBC 9.68   HGB 8.7*   HCT 29.1*        CMP:   Recent Labs  Lab 04/23/18  2250      K 3.7      CO2 27   *   BUN 12   CREATININE 0.8   CALCIUM 9.5   PROT 6.7   ALBUMIN 3.4*   BILITOT 0.6   ALKPHOS 67   AST 18   ALT  14   ANIONGAP 11   EGFRNONAA >60     All pertinent labs within the past 24 hours have been reviewed.    Significant Imaging: I have reviewed all pertinent imaging results/findings within the past 24 hours.   Imaging Results          X-Ray Chest PA And Lateral (Final result)  Result time 04/23/18 23:44:39    Final result by Dylon Vivas MD (04/23/18 23:44:39)                 Impression:      Multifocal airspace opacities.      Electronically signed by: Dylon Vivas MD  Date:    04/23/2018  Time:    23:44             Narrative:    EXAMINATION:  XR CHEST PA AND LATERAL    CLINICAL HISTORY:  Cough;    TECHNIQUE:  PA and lateral views of the chest were performed.    COMPARISON:  None    FINDINGS:  Monitoring EKG leads are present.  The trachea is unremarkable.  The cardiac silhouette is at upper limits of normal.  There is mild elevation of the right hemidiaphragm.  The right costophrenic angle is not completely imaged on the frontal projection.  The left hemidiaphragm is unremarkable.  There is no evidence of free air beneath the hemidiaphragms.  There are no pleural effusions.  There is no evidence of a pneumothorax.  There is no evidence of pneumomediastinum.  There are multifocal airspace opacities.  The osseous structures are unremarkable.                                 Assessment/Plan:     Asthma    - no apparent exacerbation  - continue home meds         Type 2 diabetes mellitus    - last A1C: No results found for: LABA1C, HGBA1C   - A1C pending for AM   - hold oral antidiabetic meds   - Diabetic diet   - SSI with accuchekcs AC/HS          COPD (chronic obstructive pulmonary disease)    - continue home meds           Pneumonia of both lungs due to infectious organism    - continue rocephin, azithromycin  - Nebs [id number]   - PRN cough meds             VTE Risk Mitigation         Ordered     enoxaparin injection 40 mg  Daily      04/24/18 0516     Place TITUS hose  Until discontinued      04/24/18 0516     Place  sequential compression device  Until discontinued      04/24/18 0516     IP VTE HIGH RISK PATIENT  Once      04/24/18 0516     Place sequential compression device  Until discontinued      04/24/18 0051             Agustina Stewart PA-C  Department of Hospital Medicine   [facility]

## 2018-04-24 NOTE — PROGRESS NOTES
Patient O2 sats dropped to 75% without oxygen while resting. Recorded findings in flow chart. Notified VICTORINA Gan.

## 2018-04-24 NOTE — PROGRESS NOTES
"SW met with pt and discussed with pt the dc plan with O2.  Pt came to [place] to visit son via [facility], without O2, but will need O2 upon discharge.  Pt was aware that [facility] requires portable oxygen concentrators and must be battery operated mode. Pt reported that insurance doesn't cover and are expensive and can't afford.  SW discussed with pt possible need son to drive her back to [place] and SW will call [employer] (they support oxygen to pt in [place]) in an effort to get O2 delivered to pt, enough to travel home with.    FELECIA called Dai,  with [employer], twice, left message [phone].  FELECIA called [employer] [phone], spoke to Justyna (length of call over 55 minutes), created new account for [place] area business account # [id number] and pt account #[id number].  Justyna stated will deliver pt a tank and cart to hospital and pt can go to [facility]'s store [address] and pickup as many tanks as needed.  FELECIA explained again that pt need O2 for son's home for a few days/concentrator and tanks to travel home with.  Justyna transferred SW to [phone], spoke to Dai, who transferred SW to [place], call was terminated.      Dai from [facility] and spoke to Monie, gave direction as to what can be done to get O2 and stated to [place] may take days.    FELECIA received call from Sandra at [facility], will deliver tank and cart to pt's hospital room and the travel dept [phone], ext [phone] call FELECIA in the morning to schedule deliver to pt's discharge location home set up, "what pt has at home can be delivered to discharge location, including concentrator.  "

## 2018-04-24 NOTE — PROGRESS NOTES
[facility] Hospital Medicine  Progress Note    Patient Name: Marilee Ribeiro  MRN: [id number]  Patient Class: IP- Inpatient   Admission Date: 4/23/2018  Length of Stay: 0 days  Attending Physician: Isamar Willis MD  Primary Care Provider: Primary Doctor No        Subjective:     Principal Problem:Sepsis due to pneumonia    HPI:  Ms. Ribeiro is a 62 year old woman who presented with shortness of breath that had been present for 3 days, pleuritic chest pain, dry cough and chills.  She was using an albuterol nebulizer every 4 hours without relief.  Patient was discharged from a hospital at her home in [place] 2 weeks ago where she was treated for a COPD exacerbation and was discharged on home oxygen, but she was unable to arrange for oxygen to be brought with her on the plane so she came to visit her son without it.  On presentation here she was febrile to 100.8 and oxygen saturation was 87% on room air.  Labs showed a microcytic anemia.  CXR showed multifocal airspace opacities.    Medical history includes diabetes and hypertension.  She has thalassemia minor and is treated by a hematologist with oral iron and iron infusions.  She quit smoking at least 30 years ago.    Hospital Course:  Patient was admitted on IV antibiotics and oxygen to treat pneumonia.  The fever rapidly improved, although she continued to have a dry cough.  She was not wheezing on admission and did not require steroids while here.  Case management arranged for oxygen to be supplied for her trip home.    Interval History:  She has a dry cough.  Fever has resolved and she is much more comfortable with oxygen on.    Review of Systems   Constitutional: Positive for fever. Negative for chills.   Respiratory: Positive for cough and shortness of breath.    Cardiovascular: Negative for chest pain, palpitations and leg swelling.     Objective:     Vital Signs (Most Recent):  Temp: 98.9 °F (37.2 °C) (04/24/18 1713)  Pulse: 84  (04/24/18 1713)  Resp: 18 (04/24/18 0833)  BP: (!) 120/58 (04/24/18 1713)  SpO2: (!) 89 % (04/24/18 1713) Vital Signs (24h Range):  Temp:  [97.7 °F (36.5 °C)-100.8 °F (38.2 °C)] 98.9 °F (37.2 °C)  Pulse:  [] 84  Resp:  [13-24] 18  SpO2:  [75 %-97 %] 89 %  BP: (107-151)/(52-65) 120/58     Weight: 118.2 kg (260 lb 9.3 oz)  Body mass index is 50.89 kg/m².    Intake/Output Summary (Last 24 hours) at 04/24/18 1740  Last data filed at 04/24/18 0645   Gross per 24 hour   Intake           181.25 ml   Output              650 ml   Net          -468.75 ml      Physical Exam   Constitutional: She is oriented to person, place, and time. She appears well-developed.   Obese, sitting in recliner.   HENT:   Head: Normocephalic.   Eyes: Conjunctivae are normal. Pupils are equal, round, and reactive to light.   Neck: Neck supple. No thyromegaly present.   Cardiovascular: Normal rate, regular rhythm, normal heart sounds and intact distal pulses.  Exam reveals no gallop and no friction rub.    No murmur heard.  Pulmonary/Chest: Effort normal. No respiratory distress. She has no wheezes. She has rales.   Bibasilar crackles.   Abdominal: Soft. Bowel sounds are normal. She exhibits no distension. There is no tenderness.   Musculoskeletal: Normal range of motion. She exhibits edema.   Lymphadenopathy:     She has no cervical adenopathy.   Neurological: She is alert and oriented to person, place, and time.   Strength equal and symmetric   Skin: Skin is warm and dry. No rash noted.   Psychiatric: She has a normal mood and affect. Her behavior is normal. Thought content normal.       Significant Labs: All pertinent labs within the past 24 hours have been reviewed.    Significant Imaging: I have reviewed all pertinent imaging results/findings within the past 24 hours.    Assessment/Plan:      * Sepsis due to pneumonia    - Met SIRS criteria for sepsis on admission with fever, elevated lactic acid, tachycardia/tachypnea, documented  infection.  - Multifocal airspace opacities on CXR consistent with PNA, possibly interstitial.  - Continue to treat with IV antibiotics, oxygen  - Afebrile since early this morning.          Hypoxemia    - Severe hypoxemia with decrease in oxygen level to 70's at rest when off oxygen.  - Case management aware, arranging for portable oxygen for her trip home - possibly her son will drive her as it seems airlines make this rather complicated.          Thalassemia minor    - Known anemia, possibly contributing to hypoxemia  - She has follow up arranged with her hematologist.          Type 2 diabetes mellitus    - HbA1c 7.5  - Oral antidiabetic meds held  - Low dose SSI        Simple chronic bronchitis    - Continue respiratory treatments as needed for COPD  - Might have element of reactive airways disease, as she does not have much of a smoking history.            VTE Risk Mitigation         Ordered     enoxaparin injection 40 mg  Daily      04/24/18 0516     Place TITUS hose  Until discontinued      04/24/18 0516     Place sequential compression device  Until discontinued      04/24/18 0516     IP VTE HIGH RISK PATIENT  Once      04/24/18 0516     Place sequential compression device  Until discontinued      04/24/18 0051              Isamar Sandhu MD  Department of Hospital Medicine   [facility]

## 2018-04-24 NOTE — PLAN OF CARE
SW met with pt at bedside to complete discharge assessment, Dr. Sal Ibrahim in [place], [place] is PCP and while in [facility] on [facility] .  Pt uses O2, CPAP and nebulizer.  Pt is staying with son, Chao [phone] while in [place]  Pt is scheduled to return to [place] on Friday by air.  Pt will need O2.     04/24/18 1103   Discharge Assessment   Assessment Type Discharge Planning Assessment   Confirmed/corrected address and phone number on facesheet? Yes   Assessment information obtained from? Patient   Communicated expected length of stay with patient/caregiver no   Prior to hospitilization cognitive status: Alert/Oriented   Prior to hospitalization functional status: Independent   Current cognitive status: Alert/Oriented   Current Functional Status: Independent   Lives With alone   Able to Return to Prior Arrangements yes   Is patient able to care for self after discharge? Yes   Patient's perception of discharge disposition home or selfcare   Readmission Within The Last 30 Days no previous admission in last 30 days   Patient currently being followed by outpatient case management? No   Patient currently receives any other outside agency services? No   Equipment Currently Used at Home CPAP;oxygen;nebulizer   Do you have any problems affording any of your prescribed medications? No   Is the patient taking medications as prescribed? yes   Does the patient have transportation home? Yes   Transportation Available family or friend will provide   Does the patient receive services at the [facility]? No   Discharge Plan A Home   Patient/Family In Agreement With Plan yes

## 2018-04-24 NOTE — SUBJECTIVE & OBJECTIVE
Interval History:  She has a dry cough.  Fever has resolved and she is much more comfortable with oxygen on.    Review of Systems   Constitutional: Positive for fever. Negative for chills.   Respiratory: Positive for cough and shortness of breath.    Cardiovascular: Negative for chest pain, palpitations and leg swelling.     Objective:     Vital Signs (Most Recent):  Temp: 98.9 °F (37.2 °C) (04/24/18 1713)  Pulse: 84 (04/24/18 1713)  Resp: 18 (04/24/18 0833)  BP: (!) 120/58 (04/24/18 1713)  SpO2: (!) 89 % (04/24/18 1713) Vital Signs (24h Range):  Temp:  [97.7 °F (36.5 °C)-100.8 °F (38.2 °C)] 98.9 °F (37.2 °C)  Pulse:  [] 84  Resp:  [13-24] 18  SpO2:  [75 %-97 %] 89 %  BP: (107-151)/(52-65) 120/58     Weight: 118.2 kg (260 lb 9.3 oz)  Body mass index is 50.89 kg/m².    Intake/Output Summary (Last 24 hours) at 04/24/18 1740  Last data filed at 04/24/18 0645   Gross per 24 hour   Intake           181.25 ml   Output              650 ml   Net          -468.75 ml      Physical Exam   Constitutional: She is oriented to person, place, and time. She appears well-developed.   Obese, sitting in recliner.   HENT:   Head: Normocephalic.   Eyes: Conjunctivae are normal. Pupils are equal, round, and reactive to light.   Neck: Neck supple. No thyromegaly present.   Cardiovascular: Normal rate, regular rhythm, normal heart sounds and intact distal pulses.  Exam reveals no gallop and no friction rub.    No murmur heard.  Pulmonary/Chest: Effort normal. No respiratory distress. She has no wheezes. She has rales.   Bibasilar crackles.   Abdominal: Soft. Bowel sounds are normal. She exhibits no distension. There is no tenderness.   Musculoskeletal: Normal range of motion. She exhibits edema.   Lymphadenopathy:     She has no cervical adenopathy.   Neurological: She is alert and oriented to person, place, and time.   Strength equal and symmetric   Skin: Skin is warm and dry. No rash noted.   Psychiatric: She has a normal mood and  affect. Her behavior is normal. Thought content normal.       Significant Labs: All pertinent labs within the past 24 hours have been reviewed.    Significant Imaging: I have reviewed all pertinent imaging results/findings within the past 24 hours.

## 2018-04-25 LAB
POCT GLUCOSE: 153 MG/DL (ref 70–110)
POCT GLUCOSE: 178 MG/DL (ref 70–110)
POCT GLUCOSE: 195 MG/DL (ref 70–110)
POCT GLUCOSE: 90 MG/DL (ref 70–110)

## 2018-04-25 PROCEDURE — 94761 N-INVAS EAR/PLS OXIMETRY MLT: CPT

## 2018-04-25 PROCEDURE — 25000003 PHARM REV CODE 250: Performed by: PHYSICIAN ASSISTANT

## 2018-04-25 PROCEDURE — 99233 SBSQ HOSP IP/OBS HIGH 50: CPT | Mod: ,,, | Performed by: HOSPITALIST

## 2018-04-25 PROCEDURE — 11000001 HC ACUTE MED/SURG PRIVATE ROOM

## 2018-04-25 PROCEDURE — 94799 UNLISTED PULMONARY SVC/PX: CPT

## 2018-04-25 PROCEDURE — 27000221 HC OXYGEN, UP TO 24 HOURS

## 2018-04-25 PROCEDURE — 63600175 PHARM REV CODE 636 W HCPCS: Performed by: PHYSICIAN ASSISTANT

## 2018-04-25 PROCEDURE — 94640 AIRWAY INHALATION TREATMENT: CPT

## 2018-04-25 PROCEDURE — 25000242 PHARM REV CODE 250 ALT 637 W/ HCPCS: Performed by: INTERNAL MEDICINE

## 2018-04-25 PROCEDURE — 63600175 PHARM REV CODE 636 W HCPCS: Performed by: HOSPITALIST

## 2018-04-25 RX ORDER — PREDNISONE 20 MG/1
40 TABLET ORAL DAILY
Status: DISCONTINUED | OUTPATIENT
Start: 2018-04-25 | End: 2018-04-27 | Stop reason: HOSPADM

## 2018-04-25 RX ORDER — IPRATROPIUM BROMIDE 0.5 MG/2.5ML
500 SOLUTION RESPIRATORY (INHALATION) 4 TIMES DAILY PRN
Status: DISCONTINUED | OUTPATIENT
Start: 2018-04-25 | End: 2018-04-27 | Stop reason: HOSPADM

## 2018-04-25 RX ADMIN — BENZONATATE 100 MG: 100 CAPSULE ORAL at 06:04

## 2018-04-25 RX ADMIN — METOPROLOL SUCCINATE 25 MG: 25 TABLET, EXTENDED RELEASE ORAL at 08:04

## 2018-04-25 RX ADMIN — ACETAMINOPHEN 650 MG: 325 TABLET ORAL at 03:04

## 2018-04-25 RX ADMIN — PREGABALIN 150 MG: 75 CAPSULE ORAL at 08:04

## 2018-04-25 RX ADMIN — PREDNISONE 40 MG: 20 TABLET ORAL at 06:04

## 2018-04-25 RX ADMIN — ASPIRIN 81 MG CHEWABLE TABLET 81 MG: 81 TABLET CHEWABLE at 08:04

## 2018-04-25 RX ADMIN — INSULIN DETEMIR 15 UNITS: 100 INJECTION, SOLUTION SUBCUTANEOUS at 08:04

## 2018-04-25 RX ADMIN — ACETAMINOPHEN 650 MG: 325 TABLET ORAL at 09:04

## 2018-04-25 RX ADMIN — TIOTROPIUM BROMIDE 18 MCG: 18 CAPSULE ORAL; RESPIRATORY (INHALATION) at 08:04

## 2018-04-25 RX ADMIN — ONDANSETRON 8 MG: 8 TABLET, ORALLY DISINTEGRATING ORAL at 03:04

## 2018-04-25 RX ADMIN — SODIUM CHLORIDE: 0.9 INJECTION, SOLUTION INTRAVENOUS at 06:04

## 2018-04-25 RX ADMIN — CEFTRIAXONE SODIUM 1 G: 1 INJECTION, POWDER, FOR SOLUTION INTRAMUSCULAR; INTRAVENOUS at 12:04

## 2018-04-25 RX ADMIN — BENZONATATE 100 MG: 100 CAPSULE ORAL at 11:04

## 2018-04-25 RX ADMIN — FLUTICASONE FUROATE AND VILANTEROL TRIFENATATE 1 PUFF: 100; 25 POWDER RESPIRATORY (INHALATION) at 08:04

## 2018-04-25 RX ADMIN — INSULIN ASPART 21 UNITS: 100 INJECTION, SOLUTION INTRAVENOUS; SUBCUTANEOUS at 05:04

## 2018-04-25 RX ADMIN — INSULIN ASPART 21 UNITS: 100 INJECTION, SOLUTION INTRAVENOUS; SUBCUTANEOUS at 12:04

## 2018-04-25 RX ADMIN — ENOXAPARIN SODIUM 40 MG: 100 INJECTION SUBCUTANEOUS at 06:04

## 2018-04-25 RX ADMIN — ACETAMINOPHEN 650 MG: 325 TABLET ORAL at 08:04

## 2018-04-25 RX ADMIN — ATORVASTATIN CALCIUM 20 MG: 20 TABLET, FILM COATED ORAL at 08:04

## 2018-04-25 RX ADMIN — AZITHROMYCIN MONOHYDRATE 500 MG: 500 INJECTION, POWDER, LYOPHILIZED, FOR SOLUTION INTRAVENOUS at 01:04

## 2018-04-25 RX ADMIN — INSULIN ASPART 21 UNITS: 100 INJECTION, SOLUTION INTRAVENOUS; SUBCUTANEOUS at 08:04

## 2018-04-25 RX ADMIN — BENZONATATE 100 MG: 100 CAPSULE ORAL at 03:04

## 2018-04-25 RX ADMIN — AMLODIPINE BESYLATE 5 MG: 5 TABLET ORAL at 08:04

## 2018-04-25 NOTE — PLAN OF CARE
Problem: Patient Care Overview  Goal: Plan of Care Review  Outcome: Ongoing (interventions implemented as appropriate)  Pt remains on 3LNC. MDI given and tolerated well. IS ordered for crackle BS. Pt stable.    IS done.

## 2018-04-25 NOTE — PLAN OF CARE
Problem: Patient Care Overview  Goal: Plan of Care Review  Outcome: Ongoing (interventions implemented as appropriate)  Good saturation on nasal O2. Atrovent is on hld because patient is getting spiriva.

## 2018-04-25 NOTE — SUBJECTIVE & OBJECTIVE
Interval History:  Feels ill.  Continues with dry cough.  Hurts her chest.  Febrile this afternoon.    Review of Systems   Constitutional: Positive for fever. Negative for chills.   Respiratory: Positive for cough, chest tightness and shortness of breath.    Cardiovascular: Negative for chest pain and palpitations.     Objective:     Vital Signs (Most Recent):  Temp: (!) 101.3 °F (38.5 °C) (04/25/18 1528)  Pulse: 91 (04/25/18 1528)  Resp: 20 (04/25/18 1528)  BP: (!) 122/58 (04/25/18 1528)  SpO2: (!) 90 % (04/25/18 1528) Vital Signs (24h Range):  Temp:  [98.7 °F (37.1 °C)-101.3 °F (38.5 °C)] 101.3 °F (38.5 °C)  Pulse:  [81-95] 91  Resp:  [16-24] 20  SpO2:  [89 %-95 %] 90 %  BP: (122-136)/(58-65) 122/58     Weight: 118.2 kg (260 lb 9.3 oz)  Body mass index is 50.89 kg/m².    Intake/Output Summary (Last 24 hours) at 04/25/18 1818  Last data filed at 04/25/18 0600   Gross per 24 hour   Intake           1487.5 ml   Output                0 ml   Net           1487.5 ml      Physical Exam   Constitutional: She is oriented to person, place, and time. She appears well-developed.   Obese, sitting in recliner.   HENT:   Head: Normocephalic.   Eyes: Conjunctivae are normal. Pupils are equal, round, and reactive to light.   Neck: Neck supple. No thyromegaly present.   Cardiovascular: Normal rate, regular rhythm, normal heart sounds and intact distal pulses.  Exam reveals no gallop and no friction rub.    No murmur heard.  Pulmonary/Chest: Effort normal. No respiratory distress. She has no wheezes. She has rales.   Bibasilar crackles.  Frequent dry cough.   Abdominal: Soft. Bowel sounds are normal. She exhibits no distension. There is no tenderness.   Musculoskeletal: Normal range of motion. She exhibits edema.   Lymphadenopathy:     She has no cervical adenopathy.   Neurological: She is alert and oriented to person, place, and time.   Strength equal and symmetric   Skin: Skin is warm and dry. No rash noted.   Psychiatric: She has  a normal mood and affect. Her behavior is normal. Thought content normal.       Significant Labs:   BMP:   Recent Labs  Lab 04/23/18  2250   *      K 3.7      CO2 27   BUN 12   CREATININE 0.8   CALCIUM 9.5     CBC:   Recent Labs  Lab 04/23/18  2250   WBC 9.68   HGB 8.7*   HCT 29.1*          Significant Imaging: I have reviewed all pertinent imaging results/findings within the past 24 hours.

## 2018-04-25 NOTE — PLAN OF CARE
Problem: Patient Care Overview  Goal: Plan of Care Review  Outcome: Ongoing (interventions implemented as appropriate)  Plan of care reviewed with patient.  IV fluids maintained, IV antibiotics administered as scheduled.  Purposeful rounding completed.  Patient stated TITUS hose were hurting, TITUS/SCDs not in use at this time per patient's request.   Call bell within reach, no needs at this time, will continue to monitor.

## 2018-04-25 NOTE — PROGRESS NOTES
8:46am - FELECIA called pt's son, Chao Ribeiro [phone], son stated will be driving pt back to [place] and O2 tank and cart delivered on yesterday from [facility] and son took it to his home.  FELECIA directed son to bring O2 back to hospital because it is needed when pt dc from hospital and [facility] will deliver home setup to his home.  Son's address:  [address] [place] [address], [place]  [address].    10:11am -  FELECIA spoke to Dai [phone], [facility]  and she stated no new auth needed and will loan pt a concentrator and make sure pt has as many tanks needed to travel.  FELECIA informed Dai pt will dc today and gave her son, Chao's phone #.

## 2018-04-25 NOTE — PROGRESS NOTES
[facility] Hospital Medicine  Progress Note    Patient Name: Marilee Ribeiro  MRN: [id number]  Patient Class: IP- Inpatient   Admission Date: 4/23/2018  Length of Stay: 1 days  Attending Physician: Isamar Willis MD  Primary Care Provider: Primary Doctor No        Subjective:     Principal Problem:Sepsis due to pneumonia    HPI:  Ms. Ribeiro is a 62 year old woman who presented with shortness of breath that had been present for 3 days, pleuritic chest pain, dry cough and chills.  She was using an albuterol nebulizer every 4 hours without relief.  Patient was discharged from a hospital at her home in [place] 2 weeks ago where she was treated for a COPD exacerbation and was discharged on home oxygen, but she was unable to arrange for oxygen to be brought with her on the plane so she came to visit her son without it.  On presentation here she was febrile to 100.8 and oxygen saturation was 87% on room air.  Labs showed a microcytic anemia.  CXR showed multifocal airspace opacities.    Medical history includes diabetes and hypertension.  She has thalassemia minor and is treated by a hematologist with oral iron and iron infusions.  She quit smoking at least 30 years ago.    Hospital Course:  Patient was admitted on IV antibiotics and oxygen to treat pneumonia.  The fever rapidly improved, although she continued to have a dry cough.  She was not wheezing on admission and did not require steroids while here.  Chest PT was added 4/25 due to persistent hypoxemia and inability to cough up secretions.  Case management arranged for oxygen to be supplied for her trip home.    Interval History:  Feels ill.  Continues with dry cough.  Hurts her chest.  Febrile this afternoon.    Review of Systems   Constitutional: Positive for fever. Negative for chills.   Respiratory: Positive for cough, chest tightness and shortness of breath.    Cardiovascular: Negative for chest pain and palpitations.     Objective:      Vital Signs (Most Recent):  Temp: (!) 101.3 °F (38.5 °C) (04/25/18 1528)  Pulse: 91 (04/25/18 1528)  Resp: 20 (04/25/18 1528)  BP: (!) 122/58 (04/25/18 1528)  SpO2: (!) 90 % (04/25/18 1528) Vital Signs (24h Range):  Temp:  [98.7 °F (37.1 °C)-101.3 °F (38.5 °C)] 101.3 °F (38.5 °C)  Pulse:  [81-95] 91  Resp:  [16-24] 20  SpO2:  [89 %-95 %] 90 %  BP: (122-136)/(58-65) 122/58     Weight: 118.2 kg (260 lb 9.3 oz)  Body mass index is 50.89 kg/m².    Intake/Output Summary (Last 24 hours) at 04/25/18 1818  Last data filed at 04/25/18 0600   Gross per 24 hour   Intake           1487.5 ml   Output                0 ml   Net           1487.5 ml      Physical Exam   Constitutional: She is oriented to person, place, and time. She appears well-developed.   Obese, sitting in recliner.   HENT:   Head: Normocephalic.   Eyes: Conjunctivae are normal. Pupils are equal, round, and reactive to light.   Neck: Neck supple. No thyromegaly present.   Cardiovascular: Normal rate, regular rhythm, normal heart sounds and intact distal pulses.  Exam reveals no gallop and no friction rub.    No murmur heard.  Pulmonary/Chest: Effort normal. No respiratory distress. She has no wheezes. She has rales.   Bibasilar crackles.  Frequent dry cough.   Abdominal: Soft. Bowel sounds are normal. She exhibits no distension. There is no tenderness.   Musculoskeletal: Normal range of motion. She exhibits edema.   Lymphadenopathy:     She has no cervical adenopathy.   Neurological: She is alert and oriented to person, place, and time.   Strength equal and symmetric   Skin: Skin is warm and dry. No rash noted.   Psychiatric: She has a normal mood and affect. Her behavior is normal. Thought content normal.       Significant Labs:   BMP:   Recent Labs  Lab 04/23/18  2250   *      K 3.7      CO2 27   BUN 12   CREATININE 0.8   CALCIUM 9.5     CBC:   Recent Labs  Lab 04/23/18  2250   WBC 9.68   HGB 8.7*   HCT 29.1*          Significant  Imaging: I have reviewed all pertinent imaging results/findings within the past 24 hours.    Assessment/Plan:      * Sepsis due to pneumonia    - Met SIRS criteria for sepsis on admission with fever, elevated lactic acid, tachycardia/tachypnea, documented infection.  - Multifocal airspace opacities on CXR consistent with PNA, possibly interstitial.  - Continue to treat with IV antibiotics, oxygen, respiratory treatments.  - Add chest PT and steroids.  - Has incentive spirometer.          Hypoxemia    - Severe hypoxemia with decrease in oxygen level to 70's at rest when off oxygen.  - Case management aware, arranging for portable oxygen for her trip home - possibly her son will drive her as it seems airlines make this rather complicated.            Thalassemia minor    - Known anemia, possibly contributing to hypoxemia  - She has follow up arranged with her hematologist.          Type 2 diabetes mellitus    - HbA1c 7.5  - Oral antidiabetic meds held  - Low dose SSI        Simple chronic bronchitis    - Continue respiratory treatments as needed for COPD  - Might have element of reactive airways disease, as she does not have much of a smoking history.  - Pulmonologist in [place] has done PFTs.  Prescribed ipratropium and Spiriva together.  - Add short course of steroids.            VTE Risk Mitigation         Ordered     enoxaparin injection 40 mg  Daily      04/24/18 0516     IP VTE HIGH RISK PATIENT  Once      04/24/18 0516              Isamar Sandhu MD  Department of Hospital Medicine   [facility]

## 2018-04-26 LAB
POCT GLUCOSE: 182 MG/DL (ref 70–110)
POCT GLUCOSE: 194 MG/DL (ref 70–110)
POCT GLUCOSE: 278 MG/DL (ref 70–110)

## 2018-04-26 PROCEDURE — 94799 UNLISTED PULMONARY SVC/PX: CPT

## 2018-04-26 PROCEDURE — 99233 SBSQ HOSP IP/OBS HIGH 50: CPT | Mod: ,,, | Performed by: HOSPITALIST

## 2018-04-26 PROCEDURE — 25000003 PHARM REV CODE 250: Performed by: PHYSICIAN ASSISTANT

## 2018-04-26 PROCEDURE — 63600175 PHARM REV CODE 636 W HCPCS: Performed by: HOSPITALIST

## 2018-04-26 PROCEDURE — 94761 N-INVAS EAR/PLS OXIMETRY MLT: CPT

## 2018-04-26 PROCEDURE — 27000221 HC OXYGEN, UP TO 24 HOURS

## 2018-04-26 PROCEDURE — 99900035 HC TECH TIME PER 15 MIN (STAT)

## 2018-04-26 PROCEDURE — 25000003 PHARM REV CODE 250: Performed by: HOSPITALIST

## 2018-04-26 PROCEDURE — 94668 MNPJ CHEST WALL SBSQ: CPT

## 2018-04-26 PROCEDURE — 25000242 PHARM REV CODE 250 ALT 637 W/ HCPCS: Performed by: INTERNAL MEDICINE

## 2018-04-26 PROCEDURE — 94640 AIRWAY INHALATION TREATMENT: CPT

## 2018-04-26 PROCEDURE — 11000001 HC ACUTE MED/SURG PRIVATE ROOM

## 2018-04-26 PROCEDURE — 63600175 PHARM REV CODE 636 W HCPCS: Performed by: PHYSICIAN ASSISTANT

## 2018-04-26 RX ADMIN — ONDANSETRON 8 MG: 8 TABLET, ORALLY DISINTEGRATING ORAL at 08:04

## 2018-04-26 RX ADMIN — BENZONATATE 100 MG: 100 CAPSULE ORAL at 04:04

## 2018-04-26 RX ADMIN — ATORVASTATIN CALCIUM 20 MG: 20 TABLET, FILM COATED ORAL at 08:04

## 2018-04-26 RX ADMIN — ASPIRIN 81 MG CHEWABLE TABLET 81 MG: 81 TABLET CHEWABLE at 08:04

## 2018-04-26 RX ADMIN — INSULIN ASPART 21 UNITS: 100 INJECTION, SOLUTION INTRAVENOUS; SUBCUTANEOUS at 04:04

## 2018-04-26 RX ADMIN — AZITHROMYCIN MONOHYDRATE 500 MG: 500 INJECTION, POWDER, LYOPHILIZED, FOR SOLUTION INTRAVENOUS at 01:04

## 2018-04-26 RX ADMIN — PREGABALIN 150 MG: 75 CAPSULE ORAL at 08:04

## 2018-04-26 RX ADMIN — PREDNISONE 40 MG: 20 TABLET ORAL at 08:04

## 2018-04-26 RX ADMIN — FLUTICASONE FUROATE AND VILANTEROL TRIFENATATE 1 PUFF: 100; 25 POWDER RESPIRATORY (INHALATION) at 07:04

## 2018-04-26 RX ADMIN — INSULIN DETEMIR 15 UNITS: 100 INJECTION, SOLUTION SUBCUTANEOUS at 08:04

## 2018-04-26 RX ADMIN — IRON SUCROSE 200 MG: 20 INJECTION, SOLUTION INTRAVENOUS at 01:04

## 2018-04-26 RX ADMIN — ACETAMINOPHEN 650 MG: 325 TABLET ORAL at 04:04

## 2018-04-26 RX ADMIN — CEFTRIAXONE SODIUM 1 G: 1 INJECTION, POWDER, FOR SOLUTION INTRAMUSCULAR; INTRAVENOUS at 12:04

## 2018-04-26 RX ADMIN — INSULIN ASPART 21 UNITS: 100 INJECTION, SOLUTION INTRAVENOUS; SUBCUTANEOUS at 08:04

## 2018-04-26 RX ADMIN — AMLODIPINE BESYLATE 5 MG: 5 TABLET ORAL at 08:04

## 2018-04-26 RX ADMIN — TIOTROPIUM BROMIDE 18 MCG: 18 CAPSULE ORAL; RESPIRATORY (INHALATION) at 07:04

## 2018-04-26 RX ADMIN — METOPROLOL SUCCINATE 25 MG: 25 TABLET, EXTENDED RELEASE ORAL at 08:04

## 2018-04-26 RX ADMIN — INSULIN ASPART 21 UNITS: 100 INJECTION, SOLUTION INTRAVENOUS; SUBCUTANEOUS at 12:04

## 2018-04-26 RX ADMIN — ENOXAPARIN SODIUM 40 MG: 100 INJECTION SUBCUTANEOUS at 04:04

## 2018-04-26 NOTE — PLAN OF CARE
Problem: Patient Care Overview  Goal: Plan of Care Review  Outcome: Ongoing (interventions implemented as appropriate)  Plan of care reviewed with patient.  IV antibiotics administered as scheduled.  Patient continues to have shortness of breath on exertion, and continues to complain of cough.  Patient encouraged to use incentive spirometer.  Purposeful rounding completed, call bell within reach, no needs at this time, will continue to monitor.

## 2018-04-26 NOTE — PROGRESS NOTES
[facility] Hospital Medicine  Progress Note    Patient Name: Marilee Ribeiro  MRN: [id number]  Patient Class: IP- Inpatient   Admission Date: 4/23/2018  Length of Stay: 2 days  Attending Physician: Isamar Willis MD  Primary Care Provider: Primary Doctor No        Subjective:     Principal Problem:Sepsis due to pneumonia    HPI:  Ms. Ribeiro is a 62 year old woman who presented with shortness of breath that had been present for 3 days, pleuritic chest pain, dry cough and chills.  She was using an albuterol nebulizer every 4 hours without relief.  Patient was discharged from a hospital at her home in [place] 2 weeks ago where she was treated for a COPD exacerbation and was discharged on home oxygen, but she was unable to arrange for oxygen to be brought with her on the plane so she came to visit her son without it.  On presentation here she was febrile to 100.8 and oxygen saturation was 87% on room air.  Labs showed a microcytic anemia.  CXR showed multifocal airspace opacities.    Medical history includes diabetes and hypertension.  She has thalassemia minor and is treated by a hematologist with oral iron and iron infusions.  She quit smoking at least 30 years ago.    Hospital Course:  Patient was admitted on IV antibiotics and oxygen to treat pneumonia.  The fever rapidly improved, although she continued to have a dry cough.  She was not wheezing on admission and did not require steroids while here.  Chest PT was added 4/25 due to persistent hypoxemia and inability to cough up secretions.  Case management arranged for oxygen to be supplied for her trip home.  She tended to decrease her oxygen saturation significantly with ambulation even with oxygen on.     Interval History:  She is feeling marginally better today, chest PT seems to have helped her cough up some secretions.  Afebrile since yesterday afternoon.  Oxygen saturation still low with ambulation.    Review of Systems    Constitutional: Negative for chills and fever.   Respiratory: Positive for cough, chest tightness and shortness of breath.    Cardiovascular: Negative for chest pain and palpitations.     Objective:     Vital Signs (Most Recent):  Temp: 97.8 °F (36.6 °C) (04/26/18 1532)  Pulse: 93 (04/26/18 1532)  Resp: 20 (04/26/18 0840)  BP: 132/66 (04/26/18 1532)  SpO2: (!) 91 % (04/26/18 1532) Vital Signs (24h Range):  Temp:  [97.8 °F (36.6 °C)-100 °F (37.8 °C)] 97.8 °F (36.6 °C)  Pulse:  [76-93] 93  Resp:  [20] 20  SpO2:  [82 %-95 %] 91 %  BP: (119-132)/(58-66) 132/66     Weight: 118.2 kg (260 lb 9.3 oz)  Body mass index is 50.89 kg/m².  No intake or output data in the 24 hours ending 04/26/18 1719   Physical Exam   Constitutional: She is oriented to person, place, and time. She appears well-developed.   Obese, sitting in recliner.   HENT:   Head: Normocephalic.   Eyes: Conjunctivae are normal. Pupils are equal, round, and reactive to light.   Neck: Neck supple. No thyromegaly present.   Cardiovascular: Normal rate, regular rhythm, normal heart sounds and intact distal pulses.  Exam reveals no gallop and no friction rub.    No murmur heard.  Pulmonary/Chest: Effort normal. No respiratory distress. She has no wheezes. She has rales.   Fine dry bibasilar crackles.  Frequent dry cough.   Abdominal: Soft. Bowel sounds are normal. She exhibits no distension. There is no tenderness.   Musculoskeletal: Normal range of motion. She exhibits no edema.   Lymphadenopathy:     She has no cervical adenopathy.   Neurological: She is alert and oriented to person, place, and time.   Strength equal and symmetric   Skin: Skin is warm and dry. No rash noted.   Psychiatric: She has a normal mood and affect. Her behavior is normal. Thought content normal.       Significant Labs: All pertinent labs within the past 24 hours have been reviewed.    Significant Imaging: I have reviewed all pertinent imaging results/findings within the past 24  hours.    Assessment/Plan:      * Sepsis due to pneumonia    - Met SIRS criteria for sepsis on admission with fever, elevated lactic acid, tachycardia/tachypnea, documented infection.  - Multifocal airspace opacities on CXR consistent with PNA, possibly interstitial.  - Continue to treat with IV antibiotics, oxygen, respiratory treatments.  - Continue chest PT and steroids.  - Has incentive spirometer.          Hypoxemia    - Severe hypoxemia with decrease in oxygen level to 70's at rest when off oxygen.  - Case management aware, arranging for portable oxygen for her trip home - possibly her son will drive her as it seems airlines make this rather complicated.            Thalassemia minor    - Known anemia, possibly contributing to hypoxemia  - Iron infusion was scheduled for her at home today - will order Venofer infusion.  - She has follow up arranged with her hematologist.          Type 2 diabetes mellitus    - HbA1c 7.5  - Oral antidiabetic meds held  - BG increasing due to steroids.  - Low dose SSI        Simple chronic bronchitis    - Continue respiratory treatments as needed for COPD  - Might have element of reactive airways disease, as she does not have much of a smoking history.  - Pulmonologist in [place] has done PFTs.  Prescribed ipratropium and Spiriva together.            VTE Risk Mitigation         Ordered     enoxaparin injection 40 mg  Daily      04/24/18 0516     IP VTE HIGH RISK PATIENT  Once      04/24/18 0516              Isamar Sandhu MD  Department of Hospital Medicine   [facility]

## 2018-04-26 NOTE — PLAN OF CARE
Problem: Patient Care Overview  Goal: Plan of Care Review  Outcome: Ongoing (interventions implemented as appropriate)  Patient in no apparent distress. Sat's  97 % on 3 lpm. CPT done with percussor. IS done. PRN treatment not needed . Will continue to monitor.

## 2018-04-26 NOTE — PLAN OF CARE
Problem: Patient Care Overview  Goal: Plan of Care Review  Outcome: Ongoing (interventions implemented as appropriate)  Self administering IS.

## 2018-04-26 NOTE — PROGRESS NOTES
Patient complaining of headache that she's been having off and on since admission, but said that she experiences these headaches whenever she is concerned about something--which happens to her overall health at this time.

## 2018-04-26 NOTE — SUBJECTIVE & OBJECTIVE
Interval History:  She is feeling marginally better today, chest PT seems to have helped her cough up some secretions.  Afebrile since yesterday afternoon.  Oxygen saturation still low with ambulation.    Review of Systems   Constitutional: Negative for chills and fever.   Respiratory: Positive for cough, chest tightness and shortness of breath.    Cardiovascular: Negative for chest pain and palpitations.     Objective:     Vital Signs (Most Recent):  Temp: 97.8 °F (36.6 °C) (04/26/18 1532)  Pulse: 93 (04/26/18 1532)  Resp: 20 (04/26/18 0840)  BP: 132/66 (04/26/18 1532)  SpO2: (!) 91 % (04/26/18 1532) Vital Signs (24h Range):  Temp:  [97.8 °F (36.6 °C)-100 °F (37.8 °C)] 97.8 °F (36.6 °C)  Pulse:  [76-93] 93  Resp:  [20] 20  SpO2:  [82 %-95 %] 91 %  BP: (119-132)/(58-66) 132/66     Weight: 118.2 kg (260 lb 9.3 oz)  Body mass index is 50.89 kg/m².  No intake or output data in the 24 hours ending 04/26/18 1719   Physical Exam   Constitutional: She is oriented to person, place, and time. She appears well-developed.   Obese, sitting in recliner.   HENT:   Head: Normocephalic.   Eyes: Conjunctivae are normal. Pupils are equal, round, and reactive to light.   Neck: Neck supple. No thyromegaly present.   Cardiovascular: Normal rate, regular rhythm, normal heart sounds and intact distal pulses.  Exam reveals no gallop and no friction rub.    No murmur heard.  Pulmonary/Chest: Effort normal. No respiratory distress. She has no wheezes. She has rales.   Fine dry bibasilar crackles.  Frequent dry cough.   Abdominal: Soft. Bowel sounds are normal. She exhibits no distension. There is no tenderness.   Musculoskeletal: Normal range of motion. She exhibits no edema.   Lymphadenopathy:     She has no cervical adenopathy.   Neurological: She is alert and oriented to person, place, and time.   Strength equal and symmetric   Skin: Skin is warm and dry. No rash noted.   Psychiatric: She has a normal mood and affect. Her behavior is  normal. Thought content normal.       Significant Labs: All pertinent labs within the past 24 hours have been reviewed.    Significant Imaging: I have reviewed all pertinent imaging results/findings within the past 24 hours.

## 2018-04-27 VITALS
BODY MASS INDEX: 51.16 KG/M2 | DIASTOLIC BLOOD PRESSURE: 61 MMHG | HEART RATE: 71 BPM | OXYGEN SATURATION: 93 % | SYSTOLIC BLOOD PRESSURE: 133 MMHG | WEIGHT: 260.56 LBS | TEMPERATURE: 98 F | RESPIRATION RATE: 18 BRPM | HEIGHT: 60 IN

## 2018-04-27 LAB
POCT GLUCOSE: 126 MG/DL (ref 70–110)
POCT GLUCOSE: 207 MG/DL (ref 70–110)

## 2018-04-27 PROCEDURE — 25000003 PHARM REV CODE 250: Performed by: PHYSICIAN ASSISTANT

## 2018-04-27 PROCEDURE — 99900035 HC TECH TIME PER 15 MIN (STAT)

## 2018-04-27 PROCEDURE — 25000242 PHARM REV CODE 250 ALT 637 W/ HCPCS: Performed by: INTERNAL MEDICINE

## 2018-04-27 PROCEDURE — 94761 N-INVAS EAR/PLS OXIMETRY MLT: CPT

## 2018-04-27 PROCEDURE — 63600175 PHARM REV CODE 636 W HCPCS: Performed by: PHYSICIAN ASSISTANT

## 2018-04-27 PROCEDURE — 27000221 HC OXYGEN, UP TO 24 HOURS

## 2018-04-27 PROCEDURE — 63600175 PHARM REV CODE 636 W HCPCS: Performed by: HOSPITALIST

## 2018-04-27 PROCEDURE — 94640 AIRWAY INHALATION TREATMENT: CPT

## 2018-04-27 PROCEDURE — 94668 MNPJ CHEST WALL SBSQ: CPT

## 2018-04-27 PROCEDURE — 94799 UNLISTED PULMONARY SVC/PX: CPT

## 2018-04-27 PROCEDURE — 99239 HOSP IP/OBS DSCHRG MGMT >30: CPT | Mod: ,,, | Performed by: HOSPITALIST

## 2018-04-27 RX ORDER — IPRATROPIUM BROMIDE 0.5 MG/2.5ML
500 SOLUTION RESPIRATORY (INHALATION) 4 TIMES DAILY PRN
Start: 2018-04-27

## 2018-04-27 RX ORDER — AMOXICILLIN AND CLAVULANATE POTASSIUM 875; 125 MG/1; MG/1
1 TABLET, FILM COATED ORAL EVERY 12 HOURS
Qty: 10 TABLET | Refills: 0 | Status: SHIPPED | OUTPATIENT
Start: 2018-04-27 | End: 2018-05-02

## 2018-04-27 RX ADMIN — CEFTRIAXONE SODIUM 1 G: 1 INJECTION, POWDER, FOR SOLUTION INTRAMUSCULAR; INTRAVENOUS at 12:04

## 2018-04-27 RX ADMIN — AMLODIPINE BESYLATE 5 MG: 5 TABLET ORAL at 09:04

## 2018-04-27 RX ADMIN — PREGABALIN 150 MG: 75 CAPSULE ORAL at 09:04

## 2018-04-27 RX ADMIN — BENZONATATE 100 MG: 100 CAPSULE ORAL at 01:04

## 2018-04-27 RX ADMIN — INSULIN ASPART 2 UNITS: 100 INJECTION, SOLUTION INTRAVENOUS; SUBCUTANEOUS at 01:04

## 2018-04-27 RX ADMIN — INSULIN ASPART 21 UNITS: 100 INJECTION, SOLUTION INTRAVENOUS; SUBCUTANEOUS at 09:04

## 2018-04-27 RX ADMIN — FLUTICASONE FUROATE AND VILANTEROL TRIFENATATE 1 PUFF: 100; 25 POWDER RESPIRATORY (INHALATION) at 08:04

## 2018-04-27 RX ADMIN — METOPROLOL SUCCINATE 25 MG: 25 TABLET, EXTENDED RELEASE ORAL at 09:04

## 2018-04-27 RX ADMIN — ACETAMINOPHEN 650 MG: 325 TABLET ORAL at 09:04

## 2018-04-27 RX ADMIN — TIOTROPIUM BROMIDE 18 MCG: 18 CAPSULE ORAL; RESPIRATORY (INHALATION) at 08:04

## 2018-04-27 RX ADMIN — AZITHROMYCIN MONOHYDRATE 500 MG: 500 INJECTION, POWDER, LYOPHILIZED, FOR SOLUTION INTRAVENOUS at 01:04

## 2018-04-27 RX ADMIN — INSULIN ASPART 21 UNITS: 100 INJECTION, SOLUTION INTRAVENOUS; SUBCUTANEOUS at 01:04

## 2018-04-27 RX ADMIN — ACETAMINOPHEN 650 MG: 325 TABLET ORAL at 01:04

## 2018-04-27 RX ADMIN — PREDNISONE 40 MG: 20 TABLET ORAL at 09:04

## 2018-04-27 RX ADMIN — ASPIRIN 81 MG CHEWABLE TABLET 81 MG: 81 TABLET CHEWABLE at 09:04

## 2018-04-27 NOTE — PROGRESS NOTES
3:00 -  called Tika at [employer] [phone], left message regarding humidifier for home O2 for pt, waiting for return call.    3:23pm - Received call form Tika, need order for humidifer (no clinicals needed) and will deliver to son's home.    3:41pm -  faxed humidifier order to [facility] [phone].

## 2018-04-27 NOTE — PLAN OF CARE
MD states pt will be medically cleared for DC this afternoon.    Pt set up with portable O2 by Agnieszka TSANG for ride home, at bedside in patient room.  Son will provide ride home.      No further needs from CM perspective.       04/27/18 1225   Final Note   Assessment Type Final Discharge Note   Discharge Disposition Home   What phone number can be called within the next 1-3 days to see how you are doing after discharge? [phone]   Discharge plans and expectations educations in teach back method with documentation complete? Yes   Right Care Referral Info   Post Acute Recommendation No Care

## 2018-04-27 NOTE — PLAN OF CARE
Problem: Patient Care Overview  Goal: Plan of Care Review  Outcome: Ongoing (interventions implemented as appropriate)  Plan of care reviewed with patient.  VSS on 3L/m O2.  IV antibiotics administered as ordered.  Purposeful rounding completed.  Patient complains of constant left knee pain that started yesterday and occasional headache, partially relieved with PRN tylenol.  Patient ambulates independently, no falls or injury.  Bed lowered and locked, call bell within reach, no needs at this time.  Will continue to monitor.

## 2018-04-29 LAB
BACTERIA BLD CULT: NORMAL
BACTERIA BLD CULT: NORMAL

## 2020-08-14 ENCOUNTER — OFFICE VISIT (OUTPATIENT)
Dept: URBAN - METROPOLITAN AREA CLINIC 109 | Facility: CLINIC | Age: 65
End: 2020-08-14
Payer: MEDICARE

## 2020-08-14 DIAGNOSIS — E66.01 MORBID OBESITY: ICD-10-CM

## 2020-08-14 DIAGNOSIS — I50.20 SYSTOLIC CONGESTIVE HEART FAILURE, UNSPECIFIED HF CHRONICITY: ICD-10-CM

## 2020-08-14 DIAGNOSIS — D50.0 IRON DEFICIENCY ANEMIA DUE TO CHRONIC BLOOD LOSS: ICD-10-CM

## 2020-08-14 DIAGNOSIS — J44.9 CHRONIC OBSTRUCTIVE PULMONARY DISEASE, UNSPECIFIED COPD TYPE: ICD-10-CM

## 2020-08-14 DIAGNOSIS — Z85.038 HISTORY OF COLON CANCER: ICD-10-CM

## 2020-08-14 DIAGNOSIS — K21.9 GASTROESOPHAGEAL REFLUX DISEASE, ESOPHAGITIS PRESENCE NOT SPECIFIED: ICD-10-CM

## 2020-08-14 DIAGNOSIS — K57.90 DIVERTICULOSIS: ICD-10-CM

## 2020-08-14 PROBLEM — 84089009 HIATAL HERNIA: Status: ACTIVE | Noted: 2020-08-14

## 2020-08-14 PROBLEM — 68496003 POLYP COLON: Status: ACTIVE | Noted: 2020-08-14

## 2020-08-14 PROBLEM — 55822004 HYPERLIPIDAEMIA: Status: ACTIVE | Noted: 2020-08-14

## 2020-08-14 PROBLEM — 195967001 ASTHMA: Status: ACTIVE | Noted: 2020-08-14

## 2020-08-14 PROBLEM — 414916001 OBESITY: Status: ACTIVE | Noted: 2020-08-14

## 2020-08-14 PROBLEM — 38341003 HYPERTENSION: Status: ACTIVE | Noted: 2020-08-14

## 2020-08-14 PROBLEM — 398050005 DIVERTICULAR DISEASE OF COLON: Status: ACTIVE | Noted: 2020-08-14

## 2020-08-14 PROBLEM — 37796009 MIGRAINE: Status: ACTIVE | Noted: 2020-08-14

## 2020-08-14 PROBLEM — 271737000 ANEMIA: Status: ACTIVE | Noted: 2020-08-14

## 2020-08-14 PROBLEM — 23986001 GLAUCOMA: Status: ACTIVE | Noted: 2020-08-14

## 2020-08-14 PROCEDURE — 99214 OFFICE O/P EST MOD 30 MIN: CPT | Performed by: INTERNAL MEDICINE

## 2020-08-14 RX ORDER — IPRATROPIUM BROMIDE 0.5 MG/2.5ML
SOLUTION RESPIRATORY (INHALATION)
Qty: 0 | Refills: 0 | Status: ACTIVE | COMMUNITY
Start: 1900-01-01

## 2020-08-14 RX ORDER — ACETAMINOPHEN 325 MG/1
TABLET, FILM COATED ORAL
Qty: 0 | Refills: 0 | Status: ACTIVE | COMMUNITY
Start: 1900-01-01

## 2020-08-14 RX ORDER — ONDANSETRON HYDROCHLORIDE 4 MG/1
1 TABLET TABLET, FILM COATED ORAL
Qty: 30 | Refills: 2

## 2020-08-14 RX ORDER — ALBUTEROL SULFATE 2.5 MG/3ML
SOLUTION RESPIRATORY (INHALATION)
Qty: 0 | Refills: 0 | Status: ACTIVE | COMMUNITY
Start: 1900-01-01

## 2020-08-14 RX ORDER — EZETIMIBE 10 MG/1
TAKE 1 TABLET (10 MG) BY ORAL ROUTE ONCE DAILY TABLET ORAL 1
Qty: 0 | Refills: 0 | Status: ACTIVE | COMMUNITY
Start: 1900-01-01

## 2020-08-14 RX ORDER — DOCUSATE SODIUM 100 MG/1
CAPSULE ORAL
Qty: 0 | Refills: 0 | Status: ACTIVE | COMMUNITY
Start: 1900-01-01

## 2020-08-14 RX ORDER — OMEPRAZOLE 40 MG/1
CAPSULE, DELAYED RELEASE PELLETS ORAL
Qty: 0 | Refills: 0 | Status: ACTIVE | COMMUNITY
Start: 1900-01-01

## 2020-08-14 RX ORDER — FLUTICASONE PROPIONATE 50 UG/1
SPRAY, METERED NASAL
Qty: 0 | Refills: 0 | Status: ACTIVE | COMMUNITY
Start: 1900-01-01

## 2020-08-14 RX ORDER — INSULIN GLARGINE 100 [IU]/ML
INJECTION, SOLUTION SUBCUTANEOUS
Qty: 0 | Refills: 0 | Status: ACTIVE | COMMUNITY
Start: 1900-01-01

## 2020-08-14 NOTE — HPI-TODAY'S VISIT:
The patient returns for follow-up care because of her history of colon cancer, GERD and chronic iron deficiency anemia status.  Reflux symptoms are under control presently.  She has been anemic since the age of 14 and intermittently has required iron infusions.  The patient has had regular colonoscopies with her last one in 2016 because of colon cancer being diagnosed in 2005.  She had right hemicolectomy at that time.  Prior upper endoscopies have revealed no ulcer disease, a small hiatus hernia and a benign, inflammatory polyp.  She has nausea periodically, but no abdominal pain. Since her last visit she has been hospitalized several times.  The patient developed a pneumonia in December and a recurrence of pneumonia with respiratory failure in late February.  She was on a ventilator and hospitalized for 17 days.  She was discharged but required readmission and a second time on the ventilator.  The patient developed congestive heart failure during that hospitalization and atrial fibrillation.  She is under the care of Dr. Dye.  Patient is just completed home physical therapy.  She is under the care of Dr. Brock and Kehinde for her lung disease.

## 2020-11-12 ENCOUNTER — TELEPHONE ENCOUNTER (OUTPATIENT)
Dept: URBAN - METROPOLITAN AREA CLINIC 92 | Facility: CLINIC | Age: 65
End: 2020-11-12

## 2020-11-12 RX ORDER — ONDANSETRON HYDROCHLORIDE 4 MG/1
1 TABLET TABLET, FILM COATED ORAL
Qty: 30 TABLET | Refills: 2

## 2021-02-18 ENCOUNTER — WEB ENCOUNTER (OUTPATIENT)
Dept: URBAN - METROPOLITAN AREA CLINIC 109 | Facility: CLINIC | Age: 66
End: 2021-02-18

## 2021-02-18 ENCOUNTER — LAB OUTSIDE AN ENCOUNTER (OUTPATIENT)
Dept: URBAN - METROPOLITAN AREA CLINIC 109 | Facility: CLINIC | Age: 66
End: 2021-02-18

## 2021-02-18 ENCOUNTER — OFFICE VISIT (OUTPATIENT)
Dept: URBAN - METROPOLITAN AREA CLINIC 109 | Facility: CLINIC | Age: 66
End: 2021-02-18
Payer: MEDICARE

## 2021-02-18 DIAGNOSIS — I50.9 CHF (CONGESTIVE HEART FAILURE): ICD-10-CM

## 2021-02-18 DIAGNOSIS — K21.9 GERD: ICD-10-CM

## 2021-02-18 DIAGNOSIS — J44.9 COPD (CHRONIC OBSTRUCTIVE PULMONARY DISEASE): ICD-10-CM

## 2021-02-18 DIAGNOSIS — D50.0 IRON DEFICIENCY ANEMIA DUE TO CHRONIC BLOOD LOSS: ICD-10-CM

## 2021-02-18 DIAGNOSIS — Z85.038 HISTORY OF COLON CANCER: ICD-10-CM

## 2021-02-18 PROCEDURE — 99214 OFFICE O/P EST MOD 30 MIN: CPT | Performed by: INTERNAL MEDICINE

## 2021-02-18 PROCEDURE — G8417 CALC BMI ABV UP PARAM F/U: HCPCS | Performed by: INTERNAL MEDICINE

## 2021-02-18 PROCEDURE — G9903 PT SCRN TBCO ID AS NON USER: HCPCS | Performed by: INTERNAL MEDICINE

## 2021-02-18 PROCEDURE — G8430 EC AT DOC MEDREC PT NOT ELIG: HCPCS | Performed by: INTERNAL MEDICINE

## 2021-02-18 PROCEDURE — G8482 FLU IMMUNIZE ORDER/ADMIN: HCPCS | Performed by: INTERNAL MEDICINE

## 2021-02-18 PROCEDURE — 3017F COLORECTAL CA SCREEN DOC REV: CPT | Performed by: INTERNAL MEDICINE

## 2021-02-18 RX ORDER — ONDANSETRON HYDROCHLORIDE 4 MG/1
1 TABLET TABLET, FILM COATED ORAL
Qty: 30 TABLET | Refills: 2 | Status: ACTIVE | COMMUNITY

## 2021-02-18 RX ORDER — ALBUTEROL SULFATE 2.5 MG/3ML
SOLUTION RESPIRATORY (INHALATION)
Qty: 0 | Refills: 0 | Status: ACTIVE | COMMUNITY
Start: 1900-01-01

## 2021-02-18 RX ORDER — FLUTICASONE PROPIONATE 50 UG/1
SPRAY, METERED NASAL
Qty: 0 | Refills: 0 | Status: ACTIVE | COMMUNITY
Start: 1900-01-01

## 2021-02-18 RX ORDER — IPRATROPIUM BROMIDE 0.5 MG/2.5ML
SOLUTION RESPIRATORY (INHALATION)
Qty: 0 | Refills: 0 | Status: ACTIVE | COMMUNITY
Start: 1900-01-01

## 2021-02-18 RX ORDER — INSULIN GLARGINE 100 [IU]/ML
INJECTION, SOLUTION SUBCUTANEOUS
Qty: 0 | Refills: 0 | Status: ACTIVE | COMMUNITY
Start: 1900-01-01

## 2021-02-18 RX ORDER — DOCUSATE SODIUM 100 MG/1
CAPSULE ORAL
Qty: 0 | Refills: 0 | Status: ACTIVE | COMMUNITY
Start: 1900-01-01

## 2021-02-18 RX ORDER — ACETAMINOPHEN 325 MG/1
TABLET, FILM COATED ORAL
Qty: 0 | Refills: 0 | Status: ACTIVE | COMMUNITY
Start: 1900-01-01

## 2021-02-18 RX ORDER — OMEPRAZOLE 40 MG/1
CAPSULE, DELAYED RELEASE PELLETS ORAL
Qty: 0 | Refills: 0 | Status: ACTIVE | COMMUNITY
Start: 1900-01-01

## 2021-02-18 RX ORDER — EZETIMIBE 10 MG/1
TAKE 1 TABLET (10 MG) BY ORAL ROUTE ONCE DAILY TABLET ORAL 1
Qty: 0 | Refills: 0 | Status: ACTIVE | COMMUNITY
Start: 1900-01-01

## 2021-02-18 NOTE — HPI-TODAY'S VISIT:
The patient returns for care and evaluation of iron deficiency anemia.  She has been anemic since a young age with periodic iron infusions through the years.  Her last colonoscopy was in 2016 and she has had regular studies due to a personal history of colon cancer in 2005.  She had a small HH on EGD in 2016. Studies were deferred on ther last visit due to her medical condition- CHF, recent pneumonia, respiratory failure requiring oxygen therapy. She is taking oral iron and maintains a good enough H&H to have fewer iron infusions, last done in October 2020. She is still on oxygen therapy.  She can only walk indoors before SOB.  She has orthopnea. She is under care of Afib and CHF.  She requires Eliquis. She has nausea intermittently.  She is taking omeprazole 40mg daily for GERD.  Heartburn is well controlled.

## 2021-06-23 ENCOUNTER — LAB OUTSIDE AN ENCOUNTER (OUTPATIENT)
Dept: URBAN - METROPOLITAN AREA CLINIC 92 | Facility: CLINIC | Age: 66
End: 2021-06-23

## 2021-06-23 ENCOUNTER — TELEPHONE ENCOUNTER (OUTPATIENT)
Dept: URBAN - METROPOLITAN AREA CLINIC 92 | Facility: CLINIC | Age: 66
End: 2021-06-23

## 2021-06-23 NOTE — HPI-TODAY'S VISIT:
Patient was called and BE results discussed.  Flex sig with or without mild sedation is recommended to exclude cancer, although it may be only spasm or diverticular narrowing.  The patient is in agreement.  Will plan a hospital based procedure, cardiac clearance.  I discussed that an unsedated, limited study is generally feasible in this setting and would be safer.

## 2021-08-19 ENCOUNTER — OFFICE VISIT (OUTPATIENT)
Dept: URBAN - METROPOLITAN AREA CLINIC 109 | Facility: CLINIC | Age: 66
End: 2021-08-19
Payer: MEDICARE

## 2021-08-19 ENCOUNTER — LAB OUTSIDE AN ENCOUNTER (OUTPATIENT)
Dept: URBAN - METROPOLITAN AREA CLINIC 109 | Facility: CLINIC | Age: 66
End: 2021-08-19

## 2021-08-19 DIAGNOSIS — J44.9 COPD (CHRONIC OBSTRUCTIVE PULMONARY DISEASE): ICD-10-CM

## 2021-08-19 DIAGNOSIS — R93.3 ABNORMAL BARIUM ENEMA: ICD-10-CM

## 2021-08-19 DIAGNOSIS — I50.9 CHF (CONGESTIVE HEART FAILURE): ICD-10-CM

## 2021-08-19 DIAGNOSIS — Z85.038 HISTORY OF COLON CANCER: ICD-10-CM

## 2021-08-19 DIAGNOSIS — D50.0 IRON DEFICIENCY ANEMIA DUE TO CHRONIC BLOOD LOSS: ICD-10-CM

## 2021-08-19 PROCEDURE — 99214 OFFICE O/P EST MOD 30 MIN: CPT | Performed by: INTERNAL MEDICINE

## 2021-08-19 RX ORDER — FLUTICASONE PROPIONATE 50 UG/1
SPRAY, METERED NASAL
Qty: 0 | Refills: 0 | Status: ACTIVE | COMMUNITY
Start: 1900-01-01

## 2021-08-19 RX ORDER — DOCUSATE SODIUM 100 MG/1
CAPSULE ORAL
Qty: 0 | Refills: 0 | Status: ACTIVE | COMMUNITY
Start: 1900-01-01

## 2021-08-19 RX ORDER — ONDANSETRON HYDROCHLORIDE 4 MG/1
1 TABLET TABLET, FILM COATED ORAL
Qty: 30 TABLET | Refills: 2 | Status: ACTIVE | COMMUNITY

## 2021-08-19 RX ORDER — EZETIMIBE 10 MG/1
TAKE 1 TABLET (10 MG) BY ORAL ROUTE ONCE DAILY TABLET ORAL 1
Qty: 0 | Refills: 0 | Status: ACTIVE | COMMUNITY
Start: 1900-01-01

## 2021-08-19 RX ORDER — ACETAMINOPHEN 325 MG/1
TABLET, FILM COATED ORAL
Qty: 0 | Refills: 0 | Status: ACTIVE | COMMUNITY
Start: 1900-01-01

## 2021-08-19 RX ORDER — ALBUTEROL SULFATE 2.5 MG/3ML
SOLUTION RESPIRATORY (INHALATION)
Qty: 0 | Refills: 0 | Status: ACTIVE | COMMUNITY
Start: 1900-01-01

## 2021-08-19 RX ORDER — INSULIN GLARGINE 100 [IU]/ML
INJECTION, SOLUTION SUBCUTANEOUS
Qty: 0 | Refills: 0 | Status: ACTIVE | COMMUNITY
Start: 1900-01-01

## 2021-08-19 RX ORDER — IPRATROPIUM BROMIDE 0.5 MG/2.5ML
SOLUTION RESPIRATORY (INHALATION)
Qty: 0 | Refills: 0 | Status: ACTIVE | COMMUNITY
Start: 1900-01-01

## 2021-08-19 RX ORDER — OMEPRAZOLE 40 MG/1
CAPSULE, DELAYED RELEASE PELLETS ORAL
Qty: 0 | Refills: 0 | Status: ACTIVE | COMMUNITY
Start: 1900-01-01

## 2021-08-19 NOTE — HPI-TODAY'S VISIT:
The patient returns for f/u with a remote history of colon cancer requiring right jack colectomy and a history of iron deficiency anemia for many years without clear origin, though presumably chronic blood loss.  Her last colonoscopy was in 2016 and was unremarkable except for the surgical anatomy. She has a small HH seen on previous EGDs.  AC-BE was done in June 2021 in lieu of colonoscopy due to her high risks for anesthesia with multiple co morbid conditions which was reported as a narrowing in the sigmoid area of concern.  The report did not note her jack colectomy status and could reflect the surgical anastomosis..  She was hospitalized on 7/12/21 for about 3 weeks at [facility] for CHF.  She has morbid obesity and is on oxygen full time.  She is on anticoagulation.  She reports needing a transfusion during this hospital stay.  She has hemorrhoidal bleeding, but otherwise passes no BRBPR.  Pt has IDDM, COPD.  She is followed by a hematologist and has had iron infusions in the past.

## 2021-08-19 NOTE — PHYSICAL EXAM GASTROINTESTINAL
Abdomen , soft, obese,  nontender, nondistended , no guarding or rigidity , no masses palpable , normal bowel sounds , Liver and Spleen , no hepatomegaly present , no hepatosplenomegaly , liver nontender , spleen not palpable, lower midline scar

## 2021-10-12 ENCOUNTER — TELEPHONE ENCOUNTER (OUTPATIENT)
Dept: URBAN - METROPOLITAN AREA SURGERY CENTER 30 | Facility: SURGERY CENTER | Age: 66
End: 2021-10-12

## 2021-11-02 ENCOUNTER — OFFICE VISIT (OUTPATIENT)
Dept: URBAN - METROPOLITAN AREA MEDICAL CENTER 41 | Facility: MEDICAL CENTER | Age: 66
End: 2021-11-02
Payer: MEDICARE

## 2021-11-02 DIAGNOSIS — R93.3 ABN FINDINGS-GI TRACT: ICD-10-CM

## 2021-11-02 PROCEDURE — 45330 DIAGNOSTIC SIGMOIDOSCOPY: CPT | Performed by: INTERNAL MEDICINE

## 2021-11-11 ENCOUNTER — OFFICE VISIT (OUTPATIENT)
Dept: URBAN - METROPOLITAN AREA CLINIC 109 | Facility: CLINIC | Age: 66
End: 2021-11-11
Payer: MEDICARE

## 2021-11-11 ENCOUNTER — OFFICE VISIT (OUTPATIENT)
Dept: URBAN - METROPOLITAN AREA CLINIC 109 | Facility: CLINIC | Age: 66
End: 2021-11-11

## 2021-11-11 DIAGNOSIS — D50.0 IRON DEFICIENCY ANEMIA DUE TO CHRONIC BLOOD LOSS: ICD-10-CM

## 2021-11-11 DIAGNOSIS — Z85.038 HISTORY OF COLON CANCER: ICD-10-CM

## 2021-11-11 DIAGNOSIS — J44.9 COPD (CHRONIC OBSTRUCTIVE PULMONARY DISEASE): ICD-10-CM

## 2021-11-11 DIAGNOSIS — I50.9 CHF (CONGESTIVE HEART FAILURE): ICD-10-CM

## 2021-11-11 PROCEDURE — 99213 OFFICE O/P EST LOW 20 MIN: CPT | Performed by: INTERNAL MEDICINE

## 2021-11-11 RX ORDER — OMEPRAZOLE 40 MG/1
CAPSULE, DELAYED RELEASE PELLETS ORAL
Qty: 0 | Refills: 0 | Status: ACTIVE | COMMUNITY
Start: 1900-01-01

## 2021-11-11 RX ORDER — IPRATROPIUM BROMIDE 0.5 MG/2.5ML
SOLUTION RESPIRATORY (INHALATION)
Qty: 0 | Refills: 0 | Status: ACTIVE | COMMUNITY
Start: 1900-01-01

## 2021-11-11 RX ORDER — ONDANSETRON HYDROCHLORIDE 4 MG/1
1 TABLET TABLET, FILM COATED ORAL
Qty: 30 TABLET | Refills: 2 | Status: ACTIVE | COMMUNITY

## 2021-11-11 RX ORDER — INSULIN GLARGINE 100 [IU]/ML
INJECTION, SOLUTION SUBCUTANEOUS
Qty: 0 | Refills: 0 | Status: ACTIVE | COMMUNITY
Start: 1900-01-01

## 2021-11-11 RX ORDER — FLUTICASONE PROPIONATE 50 UG/1
SPRAY, METERED NASAL
Qty: 0 | Refills: 0 | Status: ACTIVE | COMMUNITY
Start: 1900-01-01

## 2021-11-11 RX ORDER — ALBUTEROL SULFATE 2.5 MG/3ML
SOLUTION RESPIRATORY (INHALATION)
Qty: 0 | Refills: 0 | Status: ACTIVE | COMMUNITY
Start: 1900-01-01

## 2021-11-11 RX ORDER — EZETIMIBE 10 MG/1
TAKE 1 TABLET (10 MG) BY ORAL ROUTE ONCE DAILY TABLET ORAL 1
Qty: 0 | Refills: 0 | Status: ACTIVE | COMMUNITY
Start: 1900-01-01

## 2021-11-11 RX ORDER — DOCUSATE SODIUM 100 MG/1
CAPSULE ORAL
Qty: 0 | Refills: 0 | Status: ACTIVE | COMMUNITY
Start: 1900-01-01

## 2021-11-11 RX ORDER — ACETAMINOPHEN 325 MG/1
TABLET, FILM COATED ORAL
Qty: 0 | Refills: 0 | Status: ACTIVE | COMMUNITY
Start: 1900-01-01

## 2021-11-11 NOTE — HPI-TODAY'S VISIT:
The patient returns for evaluation due to an abnormal abdominal  CT scan with contrast on 10/14/21 revealing a possible lesion in the skin of the right abdomen, approximately 6.6 cm wide and 2.8 cm deep.  No internal mass. She does take her insulin shots mostly in the right abdomen. The CT was done because she was having right abdominal pain and itching.  Right back pain was also present. She had recently undergone flex sig to the splenic flexure for possible colonic lesion on BE.  There was no pathlogy on this study.  Patient has iron deficiency anemia.  She has hemorrhoidal bleeding at times a few times a month. She is getting iron infusions and had a transfusion the day before her sigmoidoscopy recently.   The patient also has alpha thallassemia. Multiple significant co morbid condtions as previously outlined.

## 2021-11-17 ENCOUNTER — TELEPHONE ENCOUNTER (OUTPATIENT)
Dept: URBAN - METROPOLITAN AREA CLINIC 6 | Facility: CLINIC | Age: 66
End: 2021-11-17

## 2022-03-01 ENCOUNTER — TELEPHONE ENCOUNTER (OUTPATIENT)
Dept: URBAN - METROPOLITAN AREA CLINIC 86 | Facility: CLINIC | Age: 67
End: 2022-03-01

## 2022-03-03 ENCOUNTER — WEB ENCOUNTER (OUTPATIENT)
Dept: URBAN - METROPOLITAN AREA CLINIC 109 | Facility: CLINIC | Age: 67
End: 2022-03-03

## 2022-03-03 ENCOUNTER — OFFICE VISIT (OUTPATIENT)
Dept: URBAN - METROPOLITAN AREA CLINIC 109 | Facility: CLINIC | Age: 67
End: 2022-03-03
Payer: MEDICARE

## 2022-03-03 VITALS
TEMPERATURE: 97.7 F | HEIGHT: 61 IN | SYSTOLIC BLOOD PRESSURE: 140 MMHG | WEIGHT: 248.8 LBS | DIASTOLIC BLOOD PRESSURE: 62 MMHG | HEART RATE: 67 BPM | BODY MASS INDEX: 46.97 KG/M2

## 2022-03-03 DIAGNOSIS — Z85.038 HISTORY OF COLON CANCER: ICD-10-CM

## 2022-03-03 DIAGNOSIS — I50.9 CHF (CONGESTIVE HEART FAILURE): ICD-10-CM

## 2022-03-03 DIAGNOSIS — R10.84 GENERALIZED ABDOMINAL PAIN: ICD-10-CM

## 2022-03-03 DIAGNOSIS — J44.9 COPD (CHRONIC OBSTRUCTIVE PULMONARY DISEASE): ICD-10-CM

## 2022-03-03 PROBLEM — 42343007 CONGESTIVE HEART FAILURE: Status: ACTIVE | Noted: 2021-02-18

## 2022-03-03 PROBLEM — 13645005 COPD - CHRONIC OBSTRUCTIVE PULMONARY DISEASE: Status: ACTIVE | Noted: 2020-08-14

## 2022-03-03 PROCEDURE — 99213 OFFICE O/P EST LOW 20 MIN: CPT | Performed by: INTERNAL MEDICINE

## 2022-03-03 RX ORDER — INSULIN GLARGINE 100 [IU]/ML
INJECTION, SOLUTION SUBCUTANEOUS
Qty: 0 | Refills: 0 | Status: ACTIVE | COMMUNITY
Start: 1900-01-01

## 2022-03-03 RX ORDER — OMEPRAZOLE 40 MG/1
CAPSULE, DELAYED RELEASE PELLETS ORAL
Qty: 0 | Refills: 0 | Status: ACTIVE | COMMUNITY
Start: 1900-01-01

## 2022-03-03 RX ORDER — EZETIMIBE 10 MG/1
TAKE 1 TABLET (10 MG) BY ORAL ROUTE ONCE DAILY TABLET ORAL 1
Qty: 0 | Refills: 0 | Status: ACTIVE | COMMUNITY
Start: 1900-01-01

## 2022-03-03 RX ORDER — ALBUTEROL SULFATE 2.5 MG/3ML
SOLUTION RESPIRATORY (INHALATION)
Qty: 0 | Refills: 0 | Status: ACTIVE | COMMUNITY
Start: 1900-01-01

## 2022-03-03 RX ORDER — ONDANSETRON HYDROCHLORIDE 4 MG/1
1 TABLET TABLET, FILM COATED ORAL
Qty: 30 TABLET | Refills: 2 | Status: ACTIVE | COMMUNITY

## 2022-03-03 RX ORDER — DOCUSATE SODIUM 100 MG/1
CAPSULE ORAL
Qty: 0 | Refills: 0 | Status: ACTIVE | COMMUNITY
Start: 1900-01-01

## 2022-03-03 RX ORDER — IPRATROPIUM BROMIDE 0.5 MG/2.5ML
SOLUTION RESPIRATORY (INHALATION)
Qty: 0 | Refills: 0 | Status: ACTIVE | COMMUNITY
Start: 1900-01-01

## 2022-03-03 RX ORDER — ACETAMINOPHEN 325 MG/1
TABLET, FILM COATED ORAL
Qty: 0 | Refills: 0 | Status: ACTIVE | COMMUNITY
Start: 1900-01-01

## 2022-03-03 RX ORDER — FLUTICASONE PROPIONATE 50 UG/1
SPRAY, METERED NASAL
Qty: 0 | Refills: 0 | Status: ACTIVE | COMMUNITY
Start: 1900-01-01

## 2022-03-03 NOTE — HPI-TODAY'S VISIT:
The patient returns for care with a hx of anemia, suspected due to LORNA, but also has alpha thallassemia. She had an abnormal BE with a possible sigmoid lesion.  Flex sig to the splenic flexure was normal. She has a history of colon cancer in 2005. She has high risks for anesthesia/colonoscopy due to CHF, COPD, IDDM, morbid obesity. She is on oxygen continuously, though does not use it that way.  Currently, she reports a bout of abdominal pain a few days ago after eating broccoli.  Pain was diffuse and lasted for 36 hours.  Pain was gas like.  She has had this reaction to broccoli in the past.  No blood in the stool, weight loss, or change in bowel habits.

## 2022-09-29 NOTE — DISCHARGE SUMMARY
[facility] Hospital Medicine  Discharge Summary      Patient Name: Marilee Ribeiro  MRN: [id number]  Admission Date: 4/23/2018  Hospital Length of Stay: 3 days  Discharge Date and Time:  04/27/2018 2:34 PM  Attending Physician: Isamar Willis MD   Discharging Provider: Isamar Willis MD  Primary Care Provider: Primary Doctor No      HPI:   Ms. Ribeiro is a 62 year old woman who presented with shortness of breath that had been present for 3 days, pleuritic chest pain, dry cough and chills.  She was using an albuterol nebulizer every 4 hours without relief.  Patient was discharged from a hospital at her home in [place] 2 weeks ago where she was treated for a COPD exacerbation and was discharged on home oxygen, but she was unable to arrange for oxygen to be brought with her on the plane so she came to visit her son without it.  On presentation here she was febrile to 100.8 and oxygen saturation was 87% on room air.  Labs showed a microcytic anemia.  CXR showed multifocal airspace opacities.    Medical history includes diabetes and hypertension.  She has thalassemia minor and is treated by a hematologist with oral iron and iron infusions.  She quit smoking at least 30 years ago.          Hospital Course:   Patient was admitted on IV antibiotics and oxygen to treat pneumonia.  The fever rapidly improved, although she continued to have a dry cough.  She was not wheezing on admission and did not require steroids while here.  Chest PT was added 4/25 due to persistent hypoxemia and inability to cough up secretions.  Case management arranged for oxygen to be supplied for her trip home.  She tended to decrease her oxygen saturation significantly with ambulation even with oxygen on.  At rest she typically requires 2 liters, but with ambulation has to turn it up.  At discharge she was feeling better and was prescribed Augmentin to complete a 10 day course of antibiotics.         Final Active  Diagnoses:    Diagnosis Date Noted POA    PRINCIPAL PROBLEM:  Sepsis due to pneumonia [J18.9, A41.9] 04/24/2018 Yes    Hypoxemia [R09.02] 04/24/2018 Yes    Thalassemia minor [D56.3] 04/24/2018 Yes    Type 2 diabetes mellitus [E11.9] 04/24/2018 Yes    Pneumonia of both lungs due to infectious organism [J18.9]  Unknown    Simple chronic bronchitis [J41.0] 04/24/2018 Yes      Problems Resolved During this Admission:    Diagnosis Date Noted Date Resolved POA       Discharged Condition: stable    Disposition: Home or Self Care    Follow Up:  With pulmonologist in [place].    Patient Instructions:     Activity as tolerated         Medications:  Reconciled Home Medications:      Medication List      START taking these medications    amoxicillin-clavulanate 875-125mg 875-125 mg per tablet  Commonly known as:  AUGMENTIN  Take 1 tablet by mouth every 12 (twelve) hours.        CHANGE how you take these medications    ipratropium 0.02 % nebulizer solution  Commonly known as:  ATROVENT  Take 2.5 mLs (500 mcg total) by nebulization 4 (four) times daily as needed for Wheezing. Rescue  What changed:  · when to take this  · reasons to take this        CONTINUE taking these medications    albuterol 0.63 mg/3 mL Nebu  Commonly known as:  ACCUNEB  Take 0.63 mg by nebulization every 6 (six) hours as needed. Rescue     amLODIPine 5 MG tablet  Commonly known as:  NORVASC  Take 5 mg by mouth once daily.     aspirin 81 MG Chew  Take 81 mg by mouth once daily.     atorvastatin 20 MG tablet  Commonly known as:  LIPITOR  Take 20 mg by mouth nightly.     chlorthalidone 25 MG Tab  Commonly known as:  HYGROTEN  Take 25 mg by mouth once daily.     docusate sodium 100 MG capsule  Commonly known as:  COLACE  Take 100 mg by mouth 2 (two) times daily.     fluticasone-salmeterol 250-50 mcg/dose 250-50 mcg/dose diskus inhaler  Commonly known as:  ADVAIR  Inhale 1 puff into the lungs 2 (two) times daily. Controller     insulin glargine 100 unit/mL  injection  Commonly known as:  LANTUS  Inject 25 Units into the skin every evening.     * insulin lispro protamine-insulin lispro 100 unit/mL (75-25) Susp  Commonly known as:  HUMALOG 75/25  Inject 25 Units into the skin before breakfast.     * insulin lispro protamine-insulin lispro 100 unit/mL (75-25) Susp  Commonly known as:  HUMALOG 75/25  Inject 30 Units into the skin 2 (two) times daily before meals.     metFORMIN 1000 MG tablet  Commonly known as:  GLUCOPHAGE  Take 1,000 mg by mouth 2 (two) times daily with meals.     metoprolol succinate 25 MG 24 hr tablet  Commonly known as:  TOPROL-XL  Take 25 mg by mouth once daily.     ondansetron 4 MG tablet  Commonly known as:  ZOFRAN  Take 8 mg by mouth 2 (two) times daily.     pregabalin 150 MG capsule  Commonly known as:  LYRICA  Take 150 mg by mouth 2 (two) times daily.     tiotropium 18 mcg inhalation capsule  Commonly known as:  SPIRIVA  Inhale 18 mcg into the lungs once daily. Controller        * This list has 2 medication(s) that are the same as other medications prescribed for you. Read the directions carefully, and ask your doctor or other care provider to review them with you.                Time spent on the discharge of patient: >30 minutes  Patient was seen and examined on the date of discharge and determined to be suitable for discharge.         Isamar Sandhu MD  Department of Hospital Medicine  [facility]   no

## 2022-11-07 ENCOUNTER — OFFICE VISIT (OUTPATIENT)
Dept: URBAN - METROPOLITAN AREA CLINIC 109 | Facility: CLINIC | Age: 67
End: 2022-11-07
Payer: MEDICARE

## 2022-11-07 VITALS
HEIGHT: 61 IN | WEIGHT: 246.8 LBS | SYSTOLIC BLOOD PRESSURE: 107 MMHG | BODY MASS INDEX: 46.6 KG/M2 | HEART RATE: 66 BPM | DIASTOLIC BLOOD PRESSURE: 39 MMHG | TEMPERATURE: 97.5 F

## 2022-11-07 DIAGNOSIS — K64.9 HEMORRHOIDS, UNSPECIFIED HEMORRHOID TYPE: ICD-10-CM

## 2022-11-07 DIAGNOSIS — J44.9 CHRONIC OBSTRUCTIVE PULMONARY DISEASE, UNSPECIFIED COPD TYPE: ICD-10-CM

## 2022-11-07 DIAGNOSIS — K59.04 CHRONIC IDIOPATHIC CONSTIPATION: ICD-10-CM

## 2022-11-07 DIAGNOSIS — D50.0 IRON DEFICIENCY ANEMIA DUE TO CHRONIC BLOOD LOSS: ICD-10-CM

## 2022-11-07 DIAGNOSIS — Z85.038 HISTORY OF COLON CANCER: ICD-10-CM

## 2022-11-07 DIAGNOSIS — K92.1 HEMATOCHEZIA: ICD-10-CM

## 2022-11-07 PROBLEM — 13645005: Status: ACTIVE | Noted: 2022-11-07

## 2022-11-07 PROBLEM — 429699009 HISTORY OF MALIGNANT NEOPLASM OF COLON: Status: ACTIVE | Noted: 2021-02-18

## 2022-11-07 PROBLEM — 82934008: Status: ACTIVE | Noted: 2022-11-07

## 2022-11-07 PROBLEM — 413533008 IRON DEFICIENCY ANEMIA DUE TO CHRONIC BLOOD LOSS: Status: ACTIVE | Noted: 2021-02-18

## 2022-11-07 PROCEDURE — 99213 OFFICE O/P EST LOW 20 MIN: CPT | Performed by: INTERNAL MEDICINE

## 2022-11-07 RX ORDER — INSULIN GLARGINE 100 [IU]/ML
INJECTION, SOLUTION SUBCUTANEOUS
Qty: 0 | Refills: 0 | Status: ACTIVE | COMMUNITY
Start: 1900-01-01

## 2022-11-07 RX ORDER — FLUTICASONE PROPIONATE 50 UG/1
SPRAY, METERED NASAL
Qty: 0 | Refills: 0 | Status: ACTIVE | COMMUNITY
Start: 1900-01-01

## 2022-11-07 RX ORDER — ALBUTEROL SULFATE 2.5 MG/3ML
SOLUTION RESPIRATORY (INHALATION)
Qty: 0 | Refills: 0 | Status: ACTIVE | COMMUNITY
Start: 1900-01-01

## 2022-11-07 RX ORDER — DOCUSATE SODIUM 100 MG/1
CAPSULE ORAL
Qty: 0 | Refills: 0 | Status: ACTIVE | COMMUNITY
Start: 1900-01-01

## 2022-11-07 RX ORDER — LINACLOTIDE 145 UG/1
1 CAPSULE AT LEAST 30 MINUTES BEFORE THE FIRST MEAL OF THE DAY ON AN EMPTY STOMACH CAPSULE, GELATIN COATED ORAL ONCE A DAY
Qty: 90 CAPSULE | Refills: 3 | OUTPATIENT
Start: 2022-11-07 | End: 2023-11-02

## 2022-11-07 RX ORDER — OMEPRAZOLE 40 MG/1
CAPSULE, DELAYED RELEASE PELLETS ORAL
Qty: 0 | Refills: 0 | Status: ACTIVE | COMMUNITY
Start: 1900-01-01

## 2022-11-07 RX ORDER — IPRATROPIUM BROMIDE 0.5 MG/2.5ML
SOLUTION RESPIRATORY (INHALATION)
Qty: 0 | Refills: 0 | Status: ACTIVE | COMMUNITY
Start: 1900-01-01

## 2022-11-07 RX ORDER — EZETIMIBE 10 MG/1
TAKE 1 TABLET (10 MG) BY ORAL ROUTE ONCE DAILY TABLET ORAL 1
Qty: 0 | Refills: 0 | Status: ACTIVE | COMMUNITY
Start: 1900-01-01

## 2022-11-07 RX ORDER — ACETAMINOPHEN 325 MG/1
TABLET, FILM COATED ORAL
Qty: 0 | Refills: 0 | Status: ACTIVE | COMMUNITY
Start: 1900-01-01

## 2022-11-07 RX ORDER — ONDANSETRON HYDROCHLORIDE 4 MG/1
1 TABLET TABLET, FILM COATED ORAL
Qty: 30 TABLET | Refills: 2 | Status: ACTIVE | COMMUNITY

## 2022-11-07 NOTE — PHYSICAL EXAM GASTROINTESTINAL
Abdomen , soft, nontender, obese, nondistended , no guarding or rigidity , no masses palpable , normal bowel sounds , Liver and Spleen , no hepatomegaly present , no hepatosplenomegaly , liver nontender , spleen not palpable

## 2022-11-07 NOTE — HPI-TODAY'S VISIT:
The patient returns for care with a hx of anemia, suspected due to LORNA, but also has alpha thallassemia. She had an abnormal BE with a possible sigmoid lesion.  Flex sig to the splenic flexure  in 2021 was normal. She has a history of colon cancer in 2005.  Currently feels OK, but constipated with oral iron pills.  Gets iron infusions. She has hemorrhoidal bleeding frequently and is taking Eliquis.  She has high risks for anesthesia/colonoscopy due to CHF, COPD, IDDM, morbid obesity. She is on oxygen continuously, though does not use it that way. She is high risks for anesthesia at this point.

## 2023-02-06 ENCOUNTER — OFFICE VISIT (OUTPATIENT)
Dept: URBAN - METROPOLITAN AREA CLINIC 109 | Facility: CLINIC | Age: 68
End: 2023-02-06

## 2023-02-06 RX ORDER — OMEPRAZOLE 40 MG/1
CAPSULE, DELAYED RELEASE PELLETS ORAL
Qty: 0 | Refills: 0 | COMMUNITY
Start: 1900-01-01

## 2023-02-06 RX ORDER — LINACLOTIDE 145 UG/1
1 CAPSULE AT LEAST 30 MINUTES BEFORE THE FIRST MEAL OF THE DAY ON AN EMPTY STOMACH CAPSULE, GELATIN COATED ORAL ONCE A DAY
Qty: 90 CAPSULE | Refills: 3 | COMMUNITY
Start: 2022-11-07 | End: 2023-11-02

## 2023-02-06 RX ORDER — IPRATROPIUM BROMIDE 0.5 MG/2.5ML
SOLUTION RESPIRATORY (INHALATION)
Qty: 0 | Refills: 0 | COMMUNITY
Start: 1900-01-01

## 2023-02-06 RX ORDER — DOCUSATE SODIUM 100 MG/1
CAPSULE ORAL
Qty: 0 | Refills: 0 | COMMUNITY
Start: 1900-01-01

## 2023-02-06 RX ORDER — FLUTICASONE PROPIONATE 50 UG/1
SPRAY, METERED NASAL
Qty: 0 | Refills: 0 | COMMUNITY
Start: 1900-01-01

## 2023-02-06 RX ORDER — ALBUTEROL SULFATE 2.5 MG/3ML
SOLUTION RESPIRATORY (INHALATION)
Qty: 0 | Refills: 0 | COMMUNITY
Start: 1900-01-01

## 2023-02-06 RX ORDER — INSULIN GLARGINE 100 [IU]/ML
INJECTION, SOLUTION SUBCUTANEOUS
Qty: 0 | Refills: 0 | COMMUNITY
Start: 1900-01-01

## 2023-02-06 RX ORDER — ACETAMINOPHEN 325 MG/1
TABLET, FILM COATED ORAL
Qty: 0 | Refills: 0 | COMMUNITY
Start: 1900-01-01

## 2023-02-06 RX ORDER — ONDANSETRON HYDROCHLORIDE 4 MG/1
1 TABLET TABLET, FILM COATED ORAL
Qty: 30 TABLET | Refills: 2 | COMMUNITY

## 2023-02-06 RX ORDER — EZETIMIBE 10 MG/1
TAKE 1 TABLET (10 MG) BY ORAL ROUTE ONCE DAILY TABLET ORAL 1
Qty: 0 | Refills: 0 | COMMUNITY
Start: 1900-01-01

## 2023-03-27 ENCOUNTER — OFFICE VISIT (OUTPATIENT)
Dept: URBAN - METROPOLITAN AREA CLINIC 109 | Facility: CLINIC | Age: 68
End: 2023-03-27

## 2023-03-27 RX ORDER — ACETAMINOPHEN 325 MG/1
TABLET, FILM COATED ORAL
Qty: 0 | Refills: 0 | COMMUNITY
Start: 1900-01-01

## 2023-03-27 RX ORDER — EZETIMIBE 10 MG/1
TAKE 1 TABLET (10 MG) BY ORAL ROUTE ONCE DAILY TABLET ORAL 1
Qty: 0 | Refills: 0 | COMMUNITY
Start: 1900-01-01

## 2023-03-27 RX ORDER — INSULIN GLARGINE 100 [IU]/ML
INJECTION, SOLUTION SUBCUTANEOUS
Qty: 0 | Refills: 0 | COMMUNITY
Start: 1900-01-01

## 2023-03-27 RX ORDER — ONDANSETRON HYDROCHLORIDE 4 MG/1
1 TABLET TABLET, FILM COATED ORAL
Qty: 30 TABLET | Refills: 2 | COMMUNITY

## 2023-03-27 RX ORDER — DOCUSATE SODIUM 100 MG/1
CAPSULE ORAL
Qty: 0 | Refills: 0 | COMMUNITY
Start: 1900-01-01

## 2023-03-27 RX ORDER — ALBUTEROL SULFATE 2.5 MG/3ML
SOLUTION RESPIRATORY (INHALATION)
Qty: 0 | Refills: 0 | COMMUNITY
Start: 1900-01-01

## 2023-03-27 RX ORDER — FLUTICASONE PROPIONATE 50 UG/1
SPRAY, METERED NASAL
Qty: 0 | Refills: 0 | COMMUNITY
Start: 1900-01-01

## 2023-03-27 RX ORDER — IPRATROPIUM BROMIDE 0.5 MG/2.5ML
SOLUTION RESPIRATORY (INHALATION)
Qty: 0 | Refills: 0 | COMMUNITY
Start: 1900-01-01

## 2023-03-27 RX ORDER — OMEPRAZOLE 40 MG/1
CAPSULE, DELAYED RELEASE PELLETS ORAL
Qty: 0 | Refills: 0 | COMMUNITY
Start: 1900-01-01

## 2023-03-27 RX ORDER — LINACLOTIDE 145 UG/1
1 CAPSULE AT LEAST 30 MINUTES BEFORE THE FIRST MEAL OF THE DAY ON AN EMPTY STOMACH CAPSULE, GELATIN COATED ORAL ONCE A DAY
Qty: 90 CAPSULE | Refills: 3 | COMMUNITY
Start: 2022-11-07 | End: 2023-11-02

## 2023-07-17 ENCOUNTER — OFFICE VISIT (OUTPATIENT)
Dept: URBAN - METROPOLITAN AREA CLINIC 109 | Facility: CLINIC | Age: 68
End: 2023-07-17
Payer: MEDICARE

## 2023-07-17 VITALS
SYSTOLIC BLOOD PRESSURE: 101 MMHG | WEIGHT: 231 LBS | BODY MASS INDEX: 43.61 KG/M2 | HEART RATE: 55 BPM | DIASTOLIC BLOOD PRESSURE: 44 MMHG | TEMPERATURE: 97.2 F | HEIGHT: 61 IN

## 2023-07-17 DIAGNOSIS — E66.01 MORBID OBESITY: ICD-10-CM

## 2023-07-17 DIAGNOSIS — K21.9 GERD: ICD-10-CM

## 2023-07-17 DIAGNOSIS — J44.9 COPD (CHRONIC OBSTRUCTIVE PULMONARY DISEASE): ICD-10-CM

## 2023-07-17 DIAGNOSIS — K57.90 DIVERTICULOSIS: ICD-10-CM

## 2023-07-17 DIAGNOSIS — D50.0 IRON DEFICIENCY ANEMIA DUE TO CHRONIC BLOOD LOSS: ICD-10-CM

## 2023-07-17 DIAGNOSIS — K59.04 CHRONIC IDIOPATHIC CONSTIPATION: ICD-10-CM

## 2023-07-17 DIAGNOSIS — Z85.038 HISTORY OF COLON CANCER: ICD-10-CM

## 2023-07-17 DIAGNOSIS — I50.9 CHF (CONGESTIVE HEART FAILURE): ICD-10-CM

## 2023-07-17 PROCEDURE — 99213 OFFICE O/P EST LOW 20 MIN: CPT | Performed by: INTERNAL MEDICINE

## 2023-07-17 RX ORDER — EZETIMIBE 10 MG/1
TAKE 1 TABLET (10 MG) BY ORAL ROUTE ONCE DAILY TABLET ORAL 1
Qty: 0 | Refills: 0 | Status: ACTIVE | COMMUNITY
Start: 1900-01-01

## 2023-07-17 RX ORDER — INSULIN GLARGINE 100 [IU]/ML
INJECTION, SOLUTION SUBCUTANEOUS
Qty: 0 | Refills: 0 | Status: ACTIVE | COMMUNITY
Start: 1900-01-01

## 2023-07-17 RX ORDER — OMEPRAZOLE 40 MG/1
CAPSULE, DELAYED RELEASE PELLETS ORAL
Qty: 0 | Refills: 0 | Status: ACTIVE | COMMUNITY
Start: 1900-01-01

## 2023-07-17 RX ORDER — ALBUTEROL SULFATE 2.5 MG/3ML
SOLUTION RESPIRATORY (INHALATION)
Qty: 0 | Refills: 0 | Status: ACTIVE | COMMUNITY
Start: 1900-01-01

## 2023-07-17 RX ORDER — DOCUSATE SODIUM 100 MG/1
CAPSULE ORAL
Qty: 0 | Refills: 0 | Status: ACTIVE | COMMUNITY
Start: 1900-01-01

## 2023-07-17 RX ORDER — IPRATROPIUM BROMIDE 0.5 MG/2.5ML
SOLUTION RESPIRATORY (INHALATION)
Qty: 0 | Refills: 0 | Status: ACTIVE | COMMUNITY
Start: 1900-01-01

## 2023-07-17 RX ORDER — OMEPRAZOLE 40 MG/1
CAPSULE, DELAYED RELEASE PELLETS ORAL
OUTPATIENT

## 2023-07-17 RX ORDER — FLUTICASONE PROPIONATE 50 UG/1
SPRAY, METERED NASAL
Qty: 0 | Refills: 0 | Status: ACTIVE | COMMUNITY
Start: 1900-01-01

## 2023-07-17 RX ORDER — LINACLOTIDE 145 UG/1
1 CAPSULE AT LEAST 30 MINUTES BEFORE THE FIRST MEAL OF THE DAY ON AN EMPTY STOMACH CAPSULE, GELATIN COATED ORAL ONCE A DAY
OUTPATIENT
Start: 2022-11-07

## 2023-07-17 RX ORDER — ONDANSETRON HYDROCHLORIDE 4 MG/1
1 TABLET TABLET, FILM COATED ORAL
Qty: 30 TABLET | Refills: 2 | Status: ACTIVE | COMMUNITY

## 2023-07-17 RX ORDER — ACETAMINOPHEN 325 MG/1
TABLET, FILM COATED ORAL
Qty: 0 | Refills: 0 | Status: ACTIVE | COMMUNITY
Start: 1900-01-01

## 2023-07-17 RX ORDER — LINACLOTIDE 145 UG/1
1 CAPSULE AT LEAST 30 MINUTES BEFORE THE FIRST MEAL OF THE DAY ON AN EMPTY STOMACH CAPSULE, GELATIN COATED ORAL ONCE A DAY
Qty: 90 CAPSULE | Refills: 3 | Status: ACTIVE | COMMUNITY
Start: 2022-11-07 | End: 2023-11-02

## 2023-07-17 NOTE — HPI-TODAY'S VISIT:
The patient returns in f/u care with a hx of anemia and remote hx of colon cancer. Currently, she has been doing well ,but has multiple signficant co morbid conditions as previously documented. Her health has been relatively stable.  She had to make 2 ER visit recently. Also recently started Repatha for HLD. Bowel function is fair.  She has constipation and straining with ocassional BRBPR. Takes Linzess and Colace with fair success. Still on oxygen, continuously.  From 11/7/22 visit.   The patient returns with a  hx of anemia, suspected due to LORNA, but also has alpha thallassemia. She had an abnormal BE with a possible sigmoid lesion.  Flex sig to the splenic flexure  in 2021 was normal. She has a history of colon cancer in 2005.  Currently feels OK, but constipated with oral iron pills.  Gets iron infusions. She has hemorrhoidal bleeding frequently and is taking Eliquis.  She has high risks for anesthesia/colonoscopy due to CHF, COPD, IDDM, morbid obesity. She is on oxygen continuously, though does not use it that way. She is high risks for anesthesia at this point.

## 2023-11-01 NOTE — ASSESSMENT & PLAN NOTE
- Continue respiratory treatments as needed for COPD  - Might have element of reactive airways disease, as she does not have much of a smoking history.    
- Continue respiratory treatments as needed for COPD  - Might have element of reactive airways disease, as she does not have much of a smoking history.  - Pulmonologist in [place] has done PFTs.  Prescribed ipratropium and Spiriva together.    
- Continue respiratory treatments as needed for COPD  - Might have element of reactive airways disease, as she does not have much of a smoking history.  - Pulmonologist in [place] has done PFTs.  Prescribed ipratropium and Spiriva together.  - Add short course of steroids.    
- HbA1c 7.5  - Oral antidiabetic meds held  - BG increasing due to steroids.  - Low dose SSI  
- HbA1c 7.5  - Oral antidiabetic meds held  - Low dose SSI  
- HbA1c 7.5  - Oral antidiabetic meds held  - Low dose SSI  
- Known anemia, possibly contributing to hypoxemia  - Iron infusion was scheduled for her at home today - will order Venofer infusion.  - She has follow up arranged with her hematologist.    
- Known anemia, possibly contributing to hypoxemia  - She has follow up arranged with her hematologist.    
- Known anemia, possibly contributing to hypoxemia  - She has follow up arranged with her hematologist.    
- Met SIRS criteria for sepsis on admission with fever, elevated lactic acid, tachycardia/tachypnea, documented infection.  - Multifocal airspace opacities on CXR consistent with PNA, possibly interstitial.  - Continue to treat with IV antibiotics, oxygen  - Afebrile since early this morning.    
- Met SIRS criteria for sepsis on admission with fever, elevated lactic acid, tachycardia/tachypnea, documented infection.  - Multifocal airspace opacities on CXR consistent with PNA, possibly interstitial.  - Continue to treat with IV antibiotics, oxygen, respiratory treatments.  - Add chest PT and steroids.  - Has incentive spirometer.    
- Met SIRS criteria for sepsis on admission with fever, elevated lactic acid, tachycardia/tachypnea, documented infection.  - Multifocal airspace opacities on CXR consistent with PNA, possibly interstitial.  - Continue to treat with IV antibiotics, oxygen, respiratory treatments.  - Continue chest PT and steroids.  - Has incentive spirometer.    
- Severe hypoxemia with decrease in oxygen level to 70's at rest when off oxygen.  - Case management aware, arranging for portable oxygen for her trip home - possibly her son will drive her as it seems airlines make this rather complicated.    
- continue home meds     
- continue rocephin, azithromycin  - Nebs [id number]   - PRN cough meds     
- last A1C: No results found for: LABA1C, HGBA1C   - A1C pending for AM   - hold oral antidiabetic meds   - Diabetic diet   - SSI with accuchekcs AC/HS    
- no apparent exacerbation  - continue home meds   
ESRD needing dialysis

## 2023-11-27 ENCOUNTER — ERX REFILL RESPONSE (OUTPATIENT)
Dept: URBAN - METROPOLITAN AREA CLINIC 109 | Facility: CLINIC | Age: 68
End: 2023-11-27

## 2023-11-27 RX ORDER — LINACLOTIDE 145 UG/1
1 CAPSULE AT LEAST 30 MINUTES BEFORE THE FIRST MEAL OF THE DAY ON AN EMPTY STOMACH CAPSULE, GELATIN COATED ORAL ONCE A DAY
OUTPATIENT

## 2023-11-27 RX ORDER — LINACLOTIDE 145 UG/1
1 CAPSULE AT LEAST 30 MINUTES BEFORE THE FIRST MEAL OF THE DAY ON AN EMPTY STOMACH CAPSULE, GELATIN COATED ORAL ONCE A DAY
Qty: 90 CAPSULE | Refills: 1 | OUTPATIENT

## 2023-12-19 ENCOUNTER — DASHBOARD ENCOUNTERS (OUTPATIENT)
Age: 68
End: 2023-12-19

## 2024-01-17 ENCOUNTER — OFFICE VISIT (OUTPATIENT)
Dept: URBAN - METROPOLITAN AREA CLINIC 109 | Facility: CLINIC | Age: 69
End: 2024-01-17

## 2024-01-17 RX ORDER — ALBUTEROL SULFATE 2.5 MG/3ML
SOLUTION RESPIRATORY (INHALATION)
Qty: 0 | Refills: 0 | COMMUNITY
Start: 1900-01-01

## 2024-01-17 RX ORDER — DOCUSATE SODIUM 100 MG/1
CAPSULE ORAL
Qty: 0 | Refills: 0 | COMMUNITY
Start: 1900-01-01

## 2024-01-17 RX ORDER — OMEPRAZOLE 40 MG/1
CAPSULE, DELAYED RELEASE PELLETS ORAL
COMMUNITY

## 2024-01-17 RX ORDER — LINACLOTIDE 145 UG/1
1 CAPSULE AT LEAST 30 MINUTES BEFORE THE FIRST MEAL OF THE DAY ON AN EMPTY STOMACH CAPSULE, GELATIN COATED ORAL ONCE A DAY
Qty: 90 CAPSULE | Refills: 1 | COMMUNITY

## 2024-01-17 RX ORDER — ONDANSETRON HYDROCHLORIDE 4 MG/1
1 TABLET TABLET, FILM COATED ORAL
Qty: 30 TABLET | Refills: 2 | COMMUNITY

## 2024-01-17 RX ORDER — EZETIMIBE 10 MG/1
TAKE 1 TABLET (10 MG) BY ORAL ROUTE ONCE DAILY TABLET ORAL 1
Qty: 0 | Refills: 0 | COMMUNITY
Start: 1900-01-01

## 2024-01-17 RX ORDER — ACETAMINOPHEN 325 MG/1
TABLET, FILM COATED ORAL
Qty: 0 | Refills: 0 | COMMUNITY
Start: 1900-01-01

## 2024-01-17 RX ORDER — IPRATROPIUM BROMIDE 0.5 MG/2.5ML
SOLUTION RESPIRATORY (INHALATION)
Qty: 0 | Refills: 0 | COMMUNITY
Start: 1900-01-01

## 2024-01-17 RX ORDER — FLUTICASONE PROPIONATE 50 UG/1
SPRAY, METERED NASAL
Qty: 0 | Refills: 0 | COMMUNITY
Start: 1900-01-01

## 2024-01-17 RX ORDER — INSULIN GLARGINE 100 [IU]/ML
INJECTION, SOLUTION SUBCUTANEOUS
Qty: 0 | Refills: 0 | COMMUNITY
Start: 1900-01-01

## 2024-01-18 ENCOUNTER — CLAIMS CREATED FROM THE CLAIM WINDOW (OUTPATIENT)
Dept: URBAN - METROPOLITAN AREA MEDICAL CENTER 11 | Facility: MEDICAL CENTER | Age: 69
End: 2024-01-18
Payer: MEDICARE

## 2024-01-18 DIAGNOSIS — D64.89 ANEMIA DUE TO OTHER CAUSE: ICD-10-CM

## 2024-01-18 PROCEDURE — 99222 1ST HOSP IP/OBS MODERATE 55: CPT | Performed by: PHYSICIAN ASSISTANT

## 2024-01-18 PROCEDURE — G8427 DOCREV CUR MEDS BY ELIG CLIN: HCPCS | Performed by: PHYSICIAN ASSISTANT

## 2024-01-18 PROCEDURE — 99254 IP/OBS CNSLTJ NEW/EST MOD 60: CPT | Performed by: PHYSICIAN ASSISTANT

## 2024-01-19 ENCOUNTER — CLAIMS CREATED FROM THE CLAIM WINDOW (OUTPATIENT)
Dept: URBAN - METROPOLITAN AREA MEDICAL CENTER 11 | Facility: MEDICAL CENTER | Age: 69
End: 2024-01-19
Payer: MEDICARE

## 2024-01-19 DIAGNOSIS — D64.89 ANEMIA DUE TO OTHER CAUSE: ICD-10-CM

## 2024-01-19 PROCEDURE — 99231 SBSQ HOSP IP/OBS SF/LOW 25: CPT | Performed by: PHYSICIAN ASSISTANT

## 2024-01-22 ENCOUNTER — CLAIMS CREATED FROM THE CLAIM WINDOW (OUTPATIENT)
Dept: URBAN - METROPOLITAN AREA MEDICAL CENTER 11 | Facility: MEDICAL CENTER | Age: 69
End: 2024-01-22
Payer: MEDICARE

## 2024-01-22 DIAGNOSIS — D64.89 ANEMIA DUE TO OTHER CAUSE: ICD-10-CM

## 2024-01-22 PROCEDURE — 99231 SBSQ HOSP IP/OBS SF/LOW 25: CPT | Performed by: PHYSICIAN ASSISTANT

## 2024-01-25 ENCOUNTER — CLAIMS CREATED FROM THE CLAIM WINDOW (OUTPATIENT)
Dept: URBAN - METROPOLITAN AREA MEDICAL CENTER 11 | Facility: MEDICAL CENTER | Age: 69
End: 2024-01-25
Payer: MEDICARE

## 2024-01-25 DIAGNOSIS — K62.5 ANAL BLEEDING: ICD-10-CM

## 2024-01-25 DIAGNOSIS — D64.89 ANEMIA DUE TO OTHER CAUSE: ICD-10-CM

## 2024-01-25 PROCEDURE — 99231 SBSQ HOSP IP/OBS SF/LOW 25: CPT | Performed by: PHYSICIAN ASSISTANT
